# Patient Record
Sex: FEMALE | Race: WHITE | Employment: OTHER | URBAN - METROPOLITAN AREA
[De-identification: names, ages, dates, MRNs, and addresses within clinical notes are randomized per-mention and may not be internally consistent; named-entity substitution may affect disease eponyms.]

---

## 2017-05-07 ENCOUNTER — OFFICE VISIT (OUTPATIENT)
Dept: FAMILY MEDICINE CLINIC | Facility: CLINIC | Age: 62
End: 2017-05-07

## 2017-05-07 VITALS
TEMPERATURE: 98 F | BODY MASS INDEX: 27.6 KG/M2 | SYSTOLIC BLOOD PRESSURE: 134 MMHG | RESPIRATION RATE: 20 BRPM | HEART RATE: 84 BPM | HEIGHT: 62 IN | WEIGHT: 150 LBS | DIASTOLIC BLOOD PRESSURE: 80 MMHG | OXYGEN SATURATION: 99 %

## 2017-05-07 DIAGNOSIS — H11.31 SUBCONJUNCTIVAL HEMORRHAGE OF RIGHT EYE: Primary | ICD-10-CM

## 2017-05-07 PROCEDURE — 99202 OFFICE O/P NEW SF 15 MIN: CPT | Performed by: PHYSICIAN ASSISTANT

## 2017-05-07 NOTE — PATIENT INSTRUCTIONS
Subconjunctival Hemorrhage    A subconjunctival hemorrhage is a result of a broken blood vessel in the white portion of the eye. It is usually painless and may be caused by coughing, sneezing, or vomiting. An injury to the eye can cause this.  It can also

## 2017-05-07 NOTE — PROGRESS NOTES
CHIEF COMPLAINT:   Patient presents with:  Irritation: RT has some rediness x 2 days    HPI:   Guru Knutson is a 63year old female who presents with chief complaint of right eye redness. Symptoms began  2  days ago.  Symptoms have been stable since onse subconjunctival hemorrhage present on upper aspect of right eye. No periorbital edema, erythema, or tenderness to palpation.       ASSESSMENT AND PLAN:   Marilee Peralta is a 63year old female who presents with:    ASSESSMENT:   Subconjunctival hemorrhage

## 2017-09-06 ENCOUNTER — LAB ENCOUNTER (OUTPATIENT)
Dept: LAB | Age: 62
End: 2017-09-06
Attending: FAMILY MEDICINE
Payer: COMMERCIAL

## 2017-09-06 ENCOUNTER — OFFICE VISIT (OUTPATIENT)
Dept: FAMILY MEDICINE CLINIC | Facility: CLINIC | Age: 62
End: 2017-09-06

## 2017-09-06 ENCOUNTER — TELEPHONE (OUTPATIENT)
Dept: FAMILY MEDICINE CLINIC | Facility: CLINIC | Age: 62
End: 2017-09-06

## 2017-09-06 VITALS
WEIGHT: 153 LBS | SYSTOLIC BLOOD PRESSURE: 136 MMHG | OXYGEN SATURATION: 99 % | HEART RATE: 76 BPM | BODY MASS INDEX: 26.77 KG/M2 | HEIGHT: 63.25 IN | DIASTOLIC BLOOD PRESSURE: 74 MMHG | TEMPERATURE: 98 F | RESPIRATION RATE: 16 BRPM

## 2017-09-06 DIAGNOSIS — Z12.39 SCREENING FOR MALIGNANT NEOPLASM OF BREAST: ICD-10-CM

## 2017-09-06 DIAGNOSIS — Z12.4 CERVICAL CANCER SCREENING: ICD-10-CM

## 2017-09-06 DIAGNOSIS — E78.5 HYPERLIPIDEMIA, UNSPECIFIED HYPERLIPIDEMIA TYPE: ICD-10-CM

## 2017-09-06 DIAGNOSIS — M17.12 PATELLOFEMORAL ARTHRITIS OF LEFT KNEE: ICD-10-CM

## 2017-09-06 DIAGNOSIS — Z00.00 ANNUAL PHYSICAL EXAM: Primary | ICD-10-CM

## 2017-09-06 DIAGNOSIS — I10 ESSENTIAL HYPERTENSION: ICD-10-CM

## 2017-09-06 DIAGNOSIS — Z00.00 ROUTINE GENERAL MEDICAL EXAMINATION AT A HEALTH CARE FACILITY: ICD-10-CM

## 2017-09-06 LAB
ALBUMIN SERPL-MCNC: 3.8 G/DL (ref 3.5–4.8)
ALP LIVER SERPL-CCNC: 95 U/L (ref 46–118)
ALT SERPL-CCNC: 34 U/L (ref 14–54)
AST SERPL-CCNC: 21 U/L (ref 15–41)
BASOPHILS # BLD AUTO: 0.03 X10(3) UL (ref 0–0.1)
BASOPHILS NFR BLD AUTO: 0.4 %
BILIRUB SERPL-MCNC: 0.8 MG/DL (ref 0.1–2)
BUN BLD-MCNC: 15 MG/DL (ref 8–20)
CALCIUM BLD-MCNC: 9.3 MG/DL (ref 8.3–10.3)
CHLORIDE: 108 MMOL/L (ref 101–111)
CHOLEST SMN-MCNC: 170 MG/DL (ref ?–200)
CO2: 28 MMOL/L (ref 22–32)
CREAT BLD-MCNC: 0.59 MG/DL (ref 0.55–1.02)
EOSINOPHIL # BLD AUTO: 0.04 X10(3) UL (ref 0–0.3)
EOSINOPHIL NFR BLD AUTO: 0.6 %
ERYTHROCYTE [DISTWIDTH] IN BLOOD BY AUTOMATED COUNT: 12.2 % (ref 11.5–16)
GLUCOSE BLD-MCNC: 95 MG/DL (ref 70–99)
HCT VFR BLD AUTO: 46.8 % (ref 34–50)
HDLC SERPL-MCNC: 74 MG/DL (ref 45–?)
HDLC SERPL: 2.3 {RATIO} (ref ?–4.44)
HGB BLD-MCNC: 15.4 G/DL (ref 12–16)
IMMATURE GRANULOCYTE COUNT: 0.02 X10(3) UL (ref 0–1)
IMMATURE GRANULOCYTE RATIO %: 0.3 %
LDLC SERPL CALC-MCNC: 78 MG/DL (ref ?–130)
LDLC SERPL-MCNC: 18 MG/DL (ref 5–40)
LYMPHOCYTES # BLD AUTO: 1.93 X10(3) UL (ref 0.9–4)
LYMPHOCYTES NFR BLD AUTO: 27.8 %
M PROTEIN MFR SERPL ELPH: 7.6 G/DL (ref 6.1–8.3)
MCH RBC QN AUTO: 30.2 PG (ref 27–33.2)
MCHC RBC AUTO-ENTMCNC: 32.9 G/DL (ref 31–37)
MCV RBC AUTO: 91.8 FL (ref 81–100)
MONOCYTES # BLD AUTO: 0.51 X10(3) UL (ref 0.1–0.6)
MONOCYTES NFR BLD AUTO: 7.3 %
NEUTROPHIL ABS PRELIM: 4.42 X10 (3) UL (ref 1.3–6.7)
NEUTROPHILS # BLD AUTO: 4.42 X10(3) UL (ref 1.3–6.7)
NEUTROPHILS NFR BLD AUTO: 63.6 %
NONHDLC SERPL-MCNC: 96 MG/DL (ref ?–130)
PLATELET # BLD AUTO: 215 10(3)UL (ref 150–450)
POTASSIUM SERPL-SCNC: 3.9 MMOL/L (ref 3.6–5.1)
RBC # BLD AUTO: 5.1 X10(6)UL (ref 3.8–5.1)
RED CELL DISTRIBUTION WIDTH-SD: 40.7 FL (ref 35.1–46.3)
SODIUM SERPL-SCNC: 144 MMOL/L (ref 136–144)
TRIGLYCERIDES: 89 MG/DL (ref ?–150)
WBC # BLD AUTO: 7 X10(3) UL (ref 4–13)

## 2017-09-06 PROCEDURE — 87624 HPV HI-RISK TYP POOLED RSLT: CPT | Performed by: FAMILY MEDICINE

## 2017-09-06 PROCEDURE — 88175 CYTOPATH C/V AUTO FLUID REDO: CPT | Performed by: FAMILY MEDICINE

## 2017-09-06 PROCEDURE — 99396 PREV VISIT EST AGE 40-64: CPT | Performed by: FAMILY MEDICINE

## 2017-09-06 PROCEDURE — 80053 COMPREHEN METABOLIC PANEL: CPT

## 2017-09-06 PROCEDURE — 99213 OFFICE O/P EST LOW 20 MIN: CPT | Performed by: FAMILY MEDICINE

## 2017-09-06 PROCEDURE — 36415 COLL VENOUS BLD VENIPUNCTURE: CPT

## 2017-09-06 PROCEDURE — 85025 COMPLETE CBC W/AUTO DIFF WBC: CPT

## 2017-09-06 PROCEDURE — 80061 LIPID PANEL: CPT

## 2017-09-06 RX ORDER — MULTIVIT WITH MINERALS/LUTEIN
1000 TABLET ORAL DAILY
COMMUNITY

## 2017-09-06 RX ORDER — ATORVASTATIN CALCIUM 80 MG/1
80 TABLET, FILM COATED ORAL NIGHTLY
Qty: 90 TABLET | Refills: 1 | Status: SHIPPED | OUTPATIENT
Start: 2017-09-06 | End: 2017-09-06

## 2017-09-06 RX ORDER — METOPROLOL TARTRATE 50 MG/1
50 TABLET, FILM COATED ORAL 2 TIMES DAILY
Qty: 90 TABLET | Refills: 1 | Status: SHIPPED | OUTPATIENT
Start: 2017-09-06 | End: 2017-09-12

## 2017-09-06 RX ORDER — ATORVASTATIN CALCIUM 80 MG/1
80 TABLET, FILM COATED ORAL NIGHTLY
Qty: 90 TABLET | Refills: 1 | Status: SHIPPED | OUTPATIENT
Start: 2017-09-06 | End: 2018-03-05

## 2017-09-06 NOTE — PATIENT INSTRUCTIONS
Understanding Patellofemoral Syndrome    Patellofemoral syndrome is a condition that causes pain on the front of the knee. The large bones of the upper and lower leg meet at the knee.  This joint also includes a small triangle-shaped bone that rests on to · A shoe insert (orthotic). This can make your knee more stable. · Elastic tape or a brace. These can make your knee more stable. · Physical therapy. This may include exercises or other treatments. · Surgery.  In rare cases, if other treatments don’t rel 1. Quadriceps strengthening: isometrics. Position yourself as shown. Hold your right leg straight for 10 to 20 seconds and then relax. Do the exercise 5 to 10 times. 2. Quadriceps strengthening: straight leg lift. Position yourself as shown.  Raise you 7. Hip abductor strengthening (left side shown, front and side views). Position yourself as shown, standing on your left leg with the knee slightly bent.  Slowly raise your right foot about 30 degrees, hold for a few seconds and then slowly lower the foot a

## 2017-09-06 NOTE — PROGRESS NOTES
HPI:   Jong Monreal is a 63year old female that presents for well woman exam.     She is doing well. HTN stable on metoprolol. HLD stable on statin. She takes her medications regularly. She notes intermittent left knee pain for several months. auscultation bilterally, no rales/rhonchi/wheezing.   BREAST: No palpable masses, breasts are nontender, no nipple discharge, no skin changes, axillary lymph nodes non palpable  ABDOMEN:  Soft, nondistended, nontender, bowel sounds normal in all 4 quadrants

## 2017-09-06 NOTE — TELEPHONE ENCOUNTER
Pt states that she usually take one metoprolol per day the new instructions states take two per day.  Please clarify

## 2017-09-07 NOTE — TELEPHONE ENCOUNTER
Spoke with pt and clarified that she is on Metoprolol Tartrate 50mg daily and she says that has worked best for her with no issues, she states she will do BID if  recommends it. Please advise.

## 2017-09-07 NOTE — TELEPHONE ENCOUNTER
Left detailed message on phone number [phone] verified per HIPAA in regards to metoprolol. Advised to give the office a call if there are any questions.

## 2017-09-09 LAB — HPV I/H RISK 1 DNA SPEC QL NAA+PROBE: NEGATIVE

## 2017-09-11 LAB
LAST PAP RESULT: NORMAL
PAP HISTORY (OTHER THAN LAST PAP): NORMAL

## 2017-09-12 DIAGNOSIS — I10 ESSENTIAL HYPERTENSION: ICD-10-CM

## 2017-09-12 RX ORDER — METOPROLOL TARTRATE 50 MG/1
50 TABLET, FILM COATED ORAL 2 TIMES DAILY
Qty: 180 TABLET | Refills: 0 | Status: SHIPPED | OUTPATIENT
Start: 2017-09-12 | End: 2017-12-11

## 2017-10-18 ENCOUNTER — OFFICE VISIT (OUTPATIENT)
Dept: SURGERY | Facility: CLINIC | Age: 62
End: 2017-10-18

## 2017-10-18 VITALS — HEIGHT: 63 IN | BODY MASS INDEX: 27.11 KG/M2 | TEMPERATURE: 98 F | HEART RATE: 68 BPM | WEIGHT: 153 LBS

## 2017-10-18 DIAGNOSIS — N60.11 FIBROCYSTIC DISEASE OF RIGHT BREAST: Primary | ICD-10-CM

## 2017-10-18 PROCEDURE — 99212 OFFICE O/P EST SF 10 MIN: CPT | Performed by: SURGERY

## 2017-10-18 RX ORDER — FAMOTIDINE 20 MG/1
20 TABLET ORAL NIGHTLY PRN
COMMUNITY
Start: 2015-08-17

## 2017-10-18 NOTE — PROGRESS NOTES
Follow Up Visit Note       Active Problems      1. Fibrocystic disease of right breast          Chief Complaint   Patient presents with:  Breast Problem: Follow up after mammogram done 9/27/17 at [facility]. Denies any breast pain.          History of Present Ill (VITAMIN C) 1000 MG Oral Tab  Disp:  Rfl:    atorvastatin 80 MG Oral Tab Take 1 tablet (80 mg total) by mouth nightly. Disp: 90 tablet Rfl: 1   aspirin 81 MG Oral Tab Take 81 mg by mouth daily.  Disp:  Rfl:    Multiple Vitamins-Minerals (MULTIVITAMIN OR) Ta no mass, no nipple discharge, no skin change and no tenderness. Left breast exhibits inverted nipple. Left breast exhibits no mass, no nipple discharge, no skin change and no tenderness.  Breasts are symmetrical.   Lymphadenopathy:        Right axillary: No

## 2017-12-06 DIAGNOSIS — I10 ESSENTIAL HYPERTENSION: ICD-10-CM

## 2017-12-06 RX ORDER — METOPROLOL TARTRATE 50 MG/1
TABLET, FILM COATED ORAL
Qty: 180 TABLET | Refills: 0 | Status: SHIPPED | OUTPATIENT
Start: 2017-12-06 | End: 2018-03-06

## 2017-12-06 NOTE — TELEPHONE ENCOUNTER
Requesting: Metoprolol Tart 50mg  LOV: 9/6/17  RTC: 6 months  Last Relevant Labs: 9/6/17  Filled: 9/12/17 #180 with 0 refills    No future appointments.       Hypertension Medications Protocol [id number] 7:32 AM   CMP or BMP in past 12 months    Last serum

## 2018-03-05 DIAGNOSIS — E78.5 HYPERLIPIDEMIA, UNSPECIFIED HYPERLIPIDEMIA TYPE: ICD-10-CM

## 2018-03-05 RX ORDER — ATORVASTATIN CALCIUM 80 MG/1
80 TABLET, FILM COATED ORAL NIGHTLY
Qty: 90 TABLET | Refills: 1 | Status: SHIPPED | OUTPATIENT
Start: 2018-03-05 | End: 2018-09-01

## 2018-03-06 DIAGNOSIS — I10 ESSENTIAL HYPERTENSION: ICD-10-CM

## 2018-03-06 RX ORDER — METOPROLOL TARTRATE 50 MG/1
50 TABLET, FILM COATED ORAL 2 TIMES DAILY
Qty: 180 TABLET | Refills: 0 | Status: SHIPPED | OUTPATIENT
Start: 2018-03-06 | End: 2018-06-02

## 2018-03-06 RX ORDER — METOPROLOL TARTRATE 50 MG/1
TABLET, FILM COATED ORAL
Qty: 180 TABLET | Refills: 0 | OUTPATIENT
Start: 2018-03-06

## 2018-03-06 NOTE — TELEPHONE ENCOUNTER
Patient called back and scheduled the following OV:     Future Appointments  Date Time Provider Marcy Haro   3/13/2018 4:00 PM Marium Rascon DO [facility] [address] [facility] [address]     Patient requesting #[phone] for insurance purposed.  Patient notified that I can no

## 2018-03-06 NOTE — TELEPHONE ENCOUNTER
METOPROLOL TAB TAR 50MG  Will file in chart as: METOPROLOL TARTRATE 50 MG Oral Tab  TAKE 1 TABLET TWICE A DAY       Disp: 180 tablet Refills: 0    Class: Normal Start: 3/6/2018   For: Essential hypertension  Documented:3 years ago  Last refill: 12/6/2017 Return in about 6 months (around 3/6/2018).

## 2018-03-13 ENCOUNTER — OFFICE VISIT (OUTPATIENT)
Dept: FAMILY MEDICINE CLINIC | Facility: CLINIC | Age: 63
End: 2018-03-13

## 2018-03-13 VITALS
WEIGHT: 158 LBS | DIASTOLIC BLOOD PRESSURE: 80 MMHG | HEIGHT: 63.25 IN | RESPIRATION RATE: 16 BRPM | SYSTOLIC BLOOD PRESSURE: 146 MMHG | HEART RATE: 78 BPM | BODY MASS INDEX: 27.65 KG/M2

## 2018-03-13 DIAGNOSIS — E78.5 HYPERLIPIDEMIA, UNSPECIFIED HYPERLIPIDEMIA TYPE: ICD-10-CM

## 2018-03-13 DIAGNOSIS — Z51.81 THERAPEUTIC DRUG MONITORING: ICD-10-CM

## 2018-03-13 DIAGNOSIS — I10 ESSENTIAL HYPERTENSION: Primary | ICD-10-CM

## 2018-03-13 PROCEDURE — 99213 OFFICE O/P EST LOW 20 MIN: CPT | Performed by: FAMILY MEDICINE

## 2018-03-13 NOTE — PATIENT INSTRUCTIONS
Blood Pressure  Check blood pressure daily and bring log to next visit. Goal BP < 140/90  Call or follow up sooner if consistently >160/100    SHINGRIX - new shingles vaccine. Can return or get from Pharmacy, just bring record of vaccine.      Discharge positive change by cutting back to even 2,400 mg of sodium a day.   · Follow the DASH (Dietary Approaches to Stop Hypertension) eating plan. This plan recommends vegetables, fruits, whole gains, and other heart healthy foods.   · Begin an exercise program.

## 2018-03-13 NOTE — PROGRESS NOTES
HPI:   Fina Vann is a 63year old female that presents for follow-up and medication refills. She has history of hyperlipidemia on aspirin and statin without side effects. She tries to eat a heart healthy diet and walks for exercise.   She is a non 158 lb. Vital signs reviewed. Appears stated age, well groomed. Physical Exam:  GEN:  Patient is alert, awake and oriented, well developed, well nourished, no apparent distress.   HEENT:     Head:  Normocephalic, atraumatic    Eyes: EOMI, PERRLA, no scler

## 2018-05-28 ENCOUNTER — PATIENT MESSAGE (OUTPATIENT)
Dept: FAMILY MEDICINE CLINIC | Facility: CLINIC | Age: 63
End: 2018-05-28

## 2018-05-29 NOTE — TELEPHONE ENCOUNTER
From: Leroy Vail  To: Celestinamiri TorresDO  Sent: 5/28/2018 2:22 PM CDT  Subject: Non-Urgent Medical Question    I need to come in for a blood test. Do I need an appointment? What are the hours for lab?     González Meza

## 2018-06-02 DIAGNOSIS — I10 ESSENTIAL HYPERTENSION: ICD-10-CM

## 2018-06-04 RX ORDER — METOPROLOL TARTRATE 50 MG/1
TABLET, FILM COATED ORAL
Qty: 180 TABLET | Refills: 0 | Status: SHIPPED | OUTPATIENT
Start: 2018-06-04 | End: 2018-08-31

## 2018-06-04 NOTE — TELEPHONE ENCOUNTER
Requesting: Metoprolol Tartrate 50mg  LOV: 3/13/18  RTC: 6 months  Last Relevant Labs: 9/6/17  Filled: 3/6/18 #180 with 0 refills    No future appointments.       Hypertension Medications Protocol Passed6/2 9:32 AM   CMP or BMP in past 12 months    Last ser

## 2018-08-31 DIAGNOSIS — I10 ESSENTIAL HYPERTENSION: ICD-10-CM

## 2018-08-31 RX ORDER — METOPROLOL TARTRATE 50 MG/1
50 TABLET, FILM COATED ORAL 2 TIMES DAILY
Qty: 90 TABLET | Refills: 0 | Status: SHIPPED | OUTPATIENT
Start: 2018-08-31 | End: 2019-04-04

## 2018-08-31 NOTE — TELEPHONE ENCOUNTER
Requesting Metoprolol  LOV: 3/13/18  RTC: 6 months  Last Relevant Labs: 9/6/17  Filled: 6/4/18 #180 with 0 refills    No future appointments. PP - due for lab in September order is in place.   One month refill given

## 2018-10-03 ENCOUNTER — TELEPHONE (OUTPATIENT)
Dept: SURGERY | Facility: CLINIC | Age: 63
End: 2018-10-03

## 2018-10-10 ENCOUNTER — OFFICE VISIT (OUTPATIENT)
Dept: SURGERY | Facility: CLINIC | Age: 63
End: 2018-10-10
Payer: COMMERCIAL

## 2018-10-10 VITALS — HEART RATE: 68 BPM | WEIGHT: 158 LBS | BODY MASS INDEX: 28 KG/M2 | HEIGHT: 63 IN | TEMPERATURE: 99 F

## 2018-10-10 DIAGNOSIS — N60.11 FIBROCYSTIC DISEASE OF RIGHT BREAST: ICD-10-CM

## 2018-10-10 DIAGNOSIS — Z12.31 SCREENING MAMMOGRAM, ENCOUNTER FOR: Primary | ICD-10-CM

## 2018-10-10 PROCEDURE — 99213 OFFICE O/P EST LOW 20 MIN: CPT | Performed by: SURGERY

## 2018-10-10 RX ORDER — ATORVASTATIN CALCIUM 80 MG/1
80 TABLET, FILM COATED ORAL
COMMUNITY
Start: 2018-10-09 | End: 2019-05-17

## 2018-10-10 NOTE — PROGRESS NOTES
Follow Up Visit Note       Active Problems      1. Screening mammogram, encounter for    2. Fibrocystic disease of right breast          Chief Complaint   Patient presents with:  Breast Problem: Follow up after mammogram done 9/28/18 at [facility][note 3]. Doing well. Activity      Alcohol use: Yes        Comment: one drink per week      Drug use: No      Sexual activity: Not Currently        Partners: Male    Other Topics      Concerns:        Caffeine Concern: Not Asked        Exercise: Not Asked        Seat Belt: Not Negative for adenopathy. Does not bruise/bleed easily. Psychiatric/Behavioral: Negative for behavioral problems and sleep disturbance.         Physical Findings   Pulse 68   Temp 98.7 °F (37.1 °C) (Oral)   Ht 63\"   Wt 158 lb   BMI 27.99 kg/m²   Physical her increased risk of breast cancer, which does qualify her for screening MRIs. The risks, benefits, and alternatives to screening MRI were discussed. Risks include, but are not limited to, further breast biopsies. She declined at this time.   · I asked

## 2018-10-12 PROBLEM — Z80.3 FAMILY HISTORY OF BREAST CANCER IN SISTER: Status: ACTIVE | Noted: 2018-10-12

## 2019-02-15 ENCOUNTER — OFFICE VISIT (OUTPATIENT)
Dept: FAMILY MEDICINE CLINIC | Facility: CLINIC | Age: 64
End: 2019-02-15
Payer: COMMERCIAL

## 2019-02-15 VITALS
DIASTOLIC BLOOD PRESSURE: 84 MMHG | TEMPERATURE: 98 F | OXYGEN SATURATION: 98 % | WEIGHT: 147 LBS | SYSTOLIC BLOOD PRESSURE: 150 MMHG | HEART RATE: 104 BPM | BODY MASS INDEX: 27.05 KG/M2 | HEIGHT: 62 IN | RESPIRATION RATE: 20 BRPM

## 2019-02-15 DIAGNOSIS — H92.02 OTALGIA OF LEFT EAR: Primary | ICD-10-CM

## 2019-02-15 DIAGNOSIS — R50.9 LOW GRADE FEVER: ICD-10-CM

## 2019-02-15 PROCEDURE — 99213 OFFICE O/P EST LOW 20 MIN: CPT | Performed by: NURSE PRACTITIONER

## 2019-02-15 NOTE — PROGRESS NOTES
HPI:   Toshia Chang is a 66year old female who presents with ill symptoms for  1  months. Patient reports left ear pain intermittently over the last month and 1/2.  At the beginning of symptoms she was in [place] and had sinus symptoms, sore throat, pin Problem Relation Age of Onset   • Breast Cancer Sister    • Breast Cancer Sister    • Colon Cancer Neg       Social History    Tobacco Use      Smoking status: Never Smoker      Smokeless tobacco: Never Used    Alcohol use: Yes      Comment: one drink per · Return if symptoms begin such as sore throat, sinus pain, productive cough or if fever lasts longer than 10-14 days. · Follow up with ENT as discussed.        Understanding Ear Barotrauma    Ear barotrauma is ear damage caused by a difference in pressure Symptoms can be mild to severe.  The most common symptoms of ear barotrauma may include:  · Feeling of pressure in the ear  · Ear pain  · Dizziness  · Feeling like you have a blocked ear  · Bleeding from the ears or into the middle ear  · Ringing in your ea

## 2019-02-15 NOTE — PATIENT INSTRUCTIONS
· Use insufflation (blowing gently out against closed mouth and pinched nose) several times daily. · May take Tylenol if needed for discomfort/pain.   · Return if symptoms begin such as sore throat, sinus pain, productive cough or if fever lasts longer herman irritants like tobacco smoke  · Certain hormonal changes (such as, during pregnancy)  What are the symptoms of ear barotrauma? Symptoms can be mild to severe.  The most common symptoms of ear barotrauma may include:  · Feeling of pressure in the ear  · Ear

## 2019-02-22 ENCOUNTER — OFFICE VISIT (OUTPATIENT)
Dept: FAMILY MEDICINE CLINIC | Facility: CLINIC | Age: 64
End: 2019-02-22
Payer: COMMERCIAL

## 2019-02-22 VITALS
OXYGEN SATURATION: 99 % | DIASTOLIC BLOOD PRESSURE: 82 MMHG | RESPIRATION RATE: 18 BRPM | HEART RATE: 79 BPM | SYSTOLIC BLOOD PRESSURE: 146 MMHG | TEMPERATURE: 98 F

## 2019-02-22 DIAGNOSIS — L30.9 DERMATITIS: Primary | ICD-10-CM

## 2019-02-22 PROCEDURE — 99213 OFFICE O/P EST LOW 20 MIN: CPT | Performed by: NURSE PRACTITIONER

## 2019-02-22 NOTE — PATIENT INSTRUCTIONS
Follow up if worsening or no improvement    Contact Dermatitis  Contact dermatitis is a skin rash caused by something that touches the skin and makes it irritated and inflamed. Your skin may be red, swollen, dry, and may be cracked.  Blisters may form and · You can also try wet dressings. One way to do this is to wear a wet piece of clothing under a dry one. Wear a damp shirt under a dry shirt if your upper body is affected. This can relieve itching and prevent you from scratching the affected area.   · You © 5141-7601 [employer] [address]. [address] [facility], [facility] [place]. All rights reserved. This information is not intended as a substitute for professional medical care. Always follow your healthcare professional's instructions.

## 2019-02-23 NOTE — PROGRESS NOTES
CHIEF COMPLAINT:   Patient presents with:  Rash: on back and chest, small, itching, raised - Thinks it may be a few weeks old         HPI:   Kristopher Gómez is a 66year old female who presents for evaluation of a rash.   Per patient rash started in the pas • Breast Cancer Sister    • Hypertension Brother    • Heart Attack Father    • Stroke Father    • Mental Disorder Daughter         Anxiety and depression   • Colon Cancer Neg       Social History    Tobacco Use      Smoking status: Never Smoker      Smokel Risk and benefits of medication discussed. The patient indicates understanding of these issues and agrees to the plan. The patient is asked to see PCP in 3 days if sx's are not improving or if they worsen.     Patient Instructions     Follow up if wors · Apply cold compresses to soothe your sores to help relieve your symptoms. Do this for 30 minutes 3 to 4 times a day. You can make a cold compress by soaking a cloth in cold water. Squeeze out excess water.  You can add colloidal oatmeal to the water to he · Severe swelling of your face, eyelids, mouth, throat or tongue  · Trouble urinating due to swelling in the genital area  · Fever of 100.4°F (38°C) or higher  · Redness or swelling that gets worse  · Pain that gets worse  · Foul-smelling fluid leaking fro

## 2019-03-14 ENCOUNTER — OFFICE VISIT (OUTPATIENT)
Dept: PHYSICAL THERAPY | Age: 64
End: 2019-03-14
Attending: FAMILY MEDICINE
Payer: COMMERCIAL

## 2019-03-14 PROCEDURE — 97110 THERAPEUTIC EXERCISES: CPT

## 2019-03-14 PROCEDURE — 97161 PT EVAL LOW COMPLEX 20 MIN: CPT

## 2019-03-14 NOTE — PROGRESS NOTES
LOWER EXTREMITY EVALUATION:   Referring Physician: Dr. Sebastián Alex  Diagnosis: Patella Femoral Syndrome      Date of Service: 3/14/2019     PATIENT SUMMARY   Osiel Tobin is a 66year old y/o female who presents to therapy today with complaints of l to her left knee, but did state she felt a stretching relief to left sided tibial distraction. She was also tender to palpation along her left IT Band and her left IT Band was noticeably tighter than her right.  Chapincito Oviedo would benefit from skilled Physical The negative, L  negative  Posterior Drawer Test: R  negative L  negative  Shukri's Test: R  negative, L  negative  Apley Compression: R  negative, L  negative  Thessaly Test: R  negative, L  Negative  Tibial Distraction: L; stretch/relief    *4\" Step Test: participate in planning and for this course of care. Thank you for your referral. Please co-sign or sign and return this letter via fax as soon as possible to [phone].  If you have any questions, please contact me at Dept: [phone]    Sincerely,

## 2019-04-04 ENCOUNTER — TELEPHONE (OUTPATIENT)
Dept: INTERNAL MEDICINE CLINIC | Facility: CLINIC | Age: 64
End: 2019-04-04

## 2019-04-04 ENCOUNTER — OFFICE VISIT (OUTPATIENT)
Dept: INTERNAL MEDICINE CLINIC | Facility: CLINIC | Age: 64
End: 2019-04-04
Payer: COMMERCIAL

## 2019-04-04 VITALS
WEIGHT: 152.5 LBS | BODY MASS INDEX: 28.06 KG/M2 | HEART RATE: 66 BPM | DIASTOLIC BLOOD PRESSURE: 64 MMHG | TEMPERATURE: 98 F | RESPIRATION RATE: 16 BRPM | SYSTOLIC BLOOD PRESSURE: 122 MMHG | HEIGHT: 62 IN

## 2019-04-04 DIAGNOSIS — M25.561 CHRONIC PAIN OF BOTH KNEES: Primary | ICD-10-CM

## 2019-04-04 DIAGNOSIS — K21.9 GASTROESOPHAGEAL REFLUX DISEASE WITHOUT ESOPHAGITIS: ICD-10-CM

## 2019-04-04 DIAGNOSIS — I10 ESSENTIAL HYPERTENSION: ICD-10-CM

## 2019-04-04 DIAGNOSIS — G89.29 CHRONIC PAIN OF BOTH KNEES: Primary | ICD-10-CM

## 2019-04-04 DIAGNOSIS — R73.01 ELEVATED FASTING GLUCOSE: ICD-10-CM

## 2019-04-04 DIAGNOSIS — E78.00 PURE HYPERCHOLESTEROLEMIA: ICD-10-CM

## 2019-04-04 DIAGNOSIS — M25.562 CHRONIC PAIN OF BOTH KNEES: Primary | ICD-10-CM

## 2019-04-04 PROCEDURE — 99203 OFFICE O/P NEW LOW 30 MIN: CPT | Performed by: INTERNAL MEDICINE

## 2019-04-04 RX ORDER — METOPROLOL TARTRATE 50 MG/1
50 TABLET, FILM COATED ORAL 2 TIMES DAILY
Qty: 90 TABLET | Refills: 1 | Status: SHIPPED | OUTPATIENT
Start: 2019-04-04

## 2019-04-04 NOTE — PROGRESS NOTES
[facility] Internal Medicine Office Note  Chief Complaint:   Patient presents with:   Other: Physical therapy referral needed--leg pain      HPI:   This is a 60year old female coming in for establishing care  HPI  Physical therapy for both knees Tab  Disp:  Rfl:    Lactobacillus-Inulin ([facility] OR)  Disp:  Rfl:    Ascorbic Acid (VITAMIN C) 1000 MG Oral Tab  Disp:  Rfl:    aspirin 81 MG Oral Tab Take 81 mg by mouth daily.  Disp:  Rfl:    Multiple Vitamins-Minerals (MULTIVITAMIN O hypercholesterolemia  Gastroesophageal reflux disease without esophagitis      The plan is as follows  Viridiana Mahajan was seen today for other.     Diagnoses and all orders for this visit:    Chronic pain of both knees -gradually started; no trauma; worse with st

## 2019-04-04 NOTE — TELEPHONE ENCOUNTER
Records Requested from:    Dr. Cristo Sun ([facility], reports)  Phone: [phone]  Fax: [phone]      Confirmation was received. Copy of form made and placed in teal accordion file. Original form sent to scanning.

## 2019-04-17 ENCOUNTER — OFFICE VISIT (OUTPATIENT)
Dept: PHYSICAL THERAPY | Age: 64
End: 2019-04-17
Attending: INTERNAL MEDICINE
Payer: COMMERCIAL

## 2019-04-17 PROCEDURE — 97110 THERAPEUTIC EXERCISES: CPT

## 2019-04-17 PROCEDURE — 97140 MANUAL THERAPY 1/> REGIONS: CPT

## 2019-04-17 NOTE — PROGRESS NOTES
Dx: Patella Femoral Syndrome        Authorized # of Visits:  8         Next MD visit: none scheduled  Fall Risk: standard         Precautions: n/a             Subjective:  The patient came into therapy today stating her left knee pain has slightly improved demonstrate increased hip ER/ABD strength to 4+/5 to negotiate stairs and complete squatting activities without increased             knee pain > VAS 2/10 in 8 visits  · Pt will be independent and compliant with comprehensive HEP to maintain progress achie

## 2019-04-22 ENCOUNTER — OFFICE VISIT (OUTPATIENT)
Dept: PHYSICAL THERAPY | Age: 64
End: 2019-04-22
Attending: INTERNAL MEDICINE
Payer: COMMERCIAL

## 2019-04-22 PROCEDURE — 97110 THERAPEUTIC EXERCISES: CPT

## 2019-04-22 PROCEDURE — 97140 MANUAL THERAPY 1/> REGIONS: CPT

## 2019-04-22 NOTE — PROGRESS NOTES
Dx: Patella Femoral Syndrome        Authorized # of Visits:  8         Next MD visit: none scheduled  Fall Risk: standard         Precautions: n/a             Subjective:  The patient came into therapy today stating she had no pain when she walked in and on without increased knee pain > VAS 2/10 in 8 visits  · Pt will be independent and compliant with comprehensive HEP to maintain progress achieved in PT 8 visits  · Pt will perform 10, 6\" step ups with < or = VAS  2/10 pain so she can negotiate stairs more c

## 2019-04-25 ENCOUNTER — APPOINTMENT (OUTPATIENT)
Dept: PHYSICAL THERAPY | Age: 64
End: 2019-04-25
Payer: COMMERCIAL

## 2019-04-26 ENCOUNTER — OFFICE VISIT (OUTPATIENT)
Dept: PHYSICAL THERAPY | Age: 64
End: 2019-04-26
Attending: INTERNAL MEDICINE
Payer: COMMERCIAL

## 2019-04-26 PROCEDURE — 97110 THERAPEUTIC EXERCISES: CPT

## 2019-04-26 PROCEDURE — 97112 NEUROMUSCULAR REEDUCATION: CPT

## 2019-04-26 NOTE — PROGRESS NOTES
Dx: Patella Femoral Syndrome        Authorized # of Visits:  8         Next MD visit: none scheduled  Fall Risk: standard         Precautions: n/a             Subjective:  The patient came into therapy today stating she had no left knee pain when she walked can negotiate stairs more comfortably in 8 visits       Plan: POC will emphasize LE strengthening, flexibility, STM, and joint mobilizations.   Date: 4/17/2019     TX#: 2/8 Date: 4/22/19     TX#: 3/8   Date: 4/26/19                  TX#: 4/8 Date:

## 2019-04-29 ENCOUNTER — OFFICE VISIT (OUTPATIENT)
Dept: PHYSICAL THERAPY | Age: 64
End: 2019-04-29
Attending: INTERNAL MEDICINE
Payer: COMMERCIAL

## 2019-04-29 PROCEDURE — 97110 THERAPEUTIC EXERCISES: CPT

## 2019-04-29 PROCEDURE — 97140 MANUAL THERAPY 1/> REGIONS: CPT

## 2019-04-29 NOTE — PROGRESS NOTES
Dx: Patella Femoral Syndrome        Authorized # of Visits:  8         Next MD visit: none scheduled  Fall Risk: standard         Precautions: n/a             Subjective:  The patient came into therapy today stating she had no left knee pain when she walked comprehensive HEP to maintain progress achieved in PT 8 visits - MET  · Pt will perform 10, 6\" step ups with < or = VAS  2/10 pain so she can negotiate stairs more comfortably in 8 visits - MET      Plan: POC will emphasize LE strengthening, flexibility, 10 reps, 2 sets TRX suspension forward and backward lunges R and L x 10 reps, 2 sets        LLE Tandem and SLS Balance w/ and w/o FP and dynamic catching/throwing - 10 mins LLE Tandem and SLS Balance w/ and w/o FP and dynamic catching/throwing - 5 mins

## 2019-05-01 ENCOUNTER — OFFICE VISIT (OUTPATIENT)
Dept: PHYSICAL THERAPY | Age: 64
End: 2019-05-01
Attending: INTERNAL MEDICINE
Payer: COMMERCIAL

## 2019-05-01 PROCEDURE — 97110 THERAPEUTIC EXERCISES: CPT

## 2019-05-01 PROCEDURE — 97140 MANUAL THERAPY 1/> REGIONS: CPT

## 2019-05-01 NOTE — PROGRESS NOTES
Dx: Patella Femoral Syndrome        Authorized # of Visits:  8         Next MD visit: none scheduled  Fall Risk: standard         Precautions: n/a             Subjective:  The patient came into therapy today stating she had no left knee pain when she walked visits  · Pt will be independent and compliant with comprehensive HEP to maintain progress achieved in PT 8 visits - MET  · Pt will perform 10, 6\" step ups with < or = VAS  2/10 pain so she can negotiate stairs more comfortably in 8 visits - MET      Plan quadruped w/ GB x 20 reps, 1 set -- -- -- --         Wall Sits x 20 sec, 4 sets      Wall Squats x 15, 2 sets Wall Squats x 15, 2 sets Wall Squats x 15, 2 sets Wall Squats w/ Tr A holds x 10, 2 sets     Fire Hydrants R and L side w/ GB x 20 reps, 1 set --

## 2019-05-06 ENCOUNTER — APPOINTMENT (OUTPATIENT)
Dept: PHYSICAL THERAPY | Age: 64
End: 2019-05-06
Attending: INTERNAL MEDICINE
Payer: COMMERCIAL

## 2019-05-07 ENCOUNTER — OFFICE VISIT (OUTPATIENT)
Dept: PHYSICAL THERAPY | Age: 64
End: 2019-05-07
Attending: INTERNAL MEDICINE
Payer: COMMERCIAL

## 2019-05-07 ENCOUNTER — APPOINTMENT (OUTPATIENT)
Dept: PHYSICAL THERAPY | Age: 64
End: 2019-05-07
Attending: INTERNAL MEDICINE
Payer: COMMERCIAL

## 2019-05-07 PROCEDURE — 97110 THERAPEUTIC EXERCISES: CPT

## 2019-05-07 PROCEDURE — 97140 MANUAL THERAPY 1/> REGIONS: CPT

## 2019-05-07 NOTE — PROGRESS NOTES
Progress/Discharge Summary    Pt has attended 7 visits in Physical Therapy. Subjective: The patient came into therapy stating she had no left knee pain at the onset of the session.  The patient has been adhering to her HEP twice a day since her last 3/8    Date: 4/26/19                   TX#: 4/8 Date: 4/29/19                   TX#: 5/8 Date: 5/1/19                   TX#: 6/8 Date: 5/7/19                  TX#: 7/8 Date:               TX#: 8/     Treadmill 2.2 mph - 6 mins [employer] Bike - 5 mins Sport sets [employer] w/ Tr A holds x 10, 2 sets     Fire Hydrants R and L side w/ GB x 20 reps, 1 set -- -- --    L single leg STS on bleacher seat x 10, 2 sets       TKE w/  LLE x 10 reps w/ 5 sec holds, 1 set -- -- --  --         TRX suspension forward and

## 2019-05-09 ENCOUNTER — APPOINTMENT (OUTPATIENT)
Dept: PHYSICAL THERAPY | Age: 64
End: 2019-05-09
Attending: INTERNAL MEDICINE
Payer: COMMERCIAL

## 2019-05-11 ENCOUNTER — APPOINTMENT (OUTPATIENT)
Dept: LAB | Age: 64
End: 2019-05-11
Attending: INTERNAL MEDICINE
Payer: COMMERCIAL

## 2019-05-11 DIAGNOSIS — E78.00 PURE HYPERCHOLESTEROLEMIA: ICD-10-CM

## 2019-05-11 DIAGNOSIS — R73.01 ELEVATED FASTING GLUCOSE: ICD-10-CM

## 2019-05-11 PROCEDURE — 36415 COLL VENOUS BLD VENIPUNCTURE: CPT

## 2019-05-11 PROCEDURE — 80061 LIPID PANEL: CPT

## 2019-05-11 PROCEDURE — 83036 HEMOGLOBIN GLYCOSYLATED A1C: CPT

## 2019-05-11 PROCEDURE — 80053 COMPREHEN METABOLIC PANEL: CPT

## 2019-05-13 ENCOUNTER — APPOINTMENT (OUTPATIENT)
Dept: PHYSICAL THERAPY | Age: 64
End: 2019-05-13
Attending: INTERNAL MEDICINE
Payer: COMMERCIAL

## 2019-05-15 ENCOUNTER — APPOINTMENT (OUTPATIENT)
Dept: PHYSICAL THERAPY | Age: 64
End: 2019-05-15
Attending: INTERNAL MEDICINE
Payer: COMMERCIAL

## 2019-05-17 ENCOUNTER — OFFICE VISIT (OUTPATIENT)
Dept: INTERNAL MEDICINE CLINIC | Facility: CLINIC | Age: 64
End: 2019-05-17
Payer: COMMERCIAL

## 2019-05-17 VITALS
RESPIRATION RATE: 14 BRPM | WEIGHT: 151.75 LBS | HEIGHT: 62 IN | OXYGEN SATURATION: 99 % | DIASTOLIC BLOOD PRESSURE: 68 MMHG | TEMPERATURE: 98 F | BODY MASS INDEX: 27.92 KG/M2 | HEART RATE: 83 BPM | SYSTOLIC BLOOD PRESSURE: 138 MMHG

## 2019-05-17 DIAGNOSIS — R73.01 ELEVATED FASTING GLUCOSE: ICD-10-CM

## 2019-05-17 DIAGNOSIS — R20.0 NUMBNESS OF FINGERS: ICD-10-CM

## 2019-05-17 DIAGNOSIS — E78.00 PURE HYPERCHOLESTEROLEMIA: ICD-10-CM

## 2019-05-17 DIAGNOSIS — I10 ESSENTIAL HYPERTENSION: Primary | ICD-10-CM

## 2019-05-17 PROCEDURE — 99214 OFFICE O/P EST MOD 30 MIN: CPT | Performed by: INTERNAL MEDICINE

## 2019-05-17 RX ORDER — ATORVASTATIN CALCIUM 80 MG/1
80 TABLET, FILM COATED ORAL NIGHTLY
Qty: 90 TABLET | Refills: 3 | Status: SHIPPED | OUTPATIENT
Start: 2019-05-17 | End: 2019-12-24

## 2019-05-17 NOTE — PROGRESS NOTES
[facility] Internal Medicine Office Note  Chief Complaint:   Patient presents with:  Numbness: L Hand  Follow - Up: 6 Month Blood work       HPI:   This is a 60year old female coming in for follow up and numbness   HPI    Numbness of thumb,index as needed. Disp:  Rfl:    Lactobacillus-Inulin ([facility]) Take by mouth daily. Disp:  Rfl:    Ascorbic Acid (VITAMIN C) 1000 MG Oral Tab Take 1,000 mg by mouth daily.    Disp:  Rfl:    aspirin 81 MG Oral Tab Take 81 mg by mouth christy up.    Diagnoses and all orders for this visit:    Essential hypertension -chronic, stable. Cont present management     Pure hypercholesterolemia -on goal; cont present management  -     atorvastatin 80 MG Oral Tab;  Take 1 tablet (80 mg total) by mouth nig

## 2019-06-24 NOTE — TELEPHONE ENCOUNTER
Records received from her cardiologist - Dr. Bijan Pressley in [facility]    Myocardial perfusion scan 3/8/2019:  Indications: abnormal EKG and chest pain-precordial  Results:   Normal cardiac nuclear perfusion study except for fixed defect likely due to [facility]

## 2019-10-02 ENCOUNTER — TELEPHONE (OUTPATIENT)
Dept: SURGERY | Facility: CLINIC | Age: 64
End: 2019-10-02

## 2019-10-02 NOTE — TELEPHONE ENCOUNTER
----- Message from Nicole Christianson MD sent at 10/2/2019  8:54 AM CDT -----  Pt with normal mammogram at [facility].   Needs annual breast exam.  Please have patient follow up for breast exam.    Thanks,  Homar Khan

## 2019-10-03 ENCOUNTER — TELEPHONE (OUTPATIENT)
Dept: SURGERY | Facility: CLINIC | Age: 64
End: 2019-10-03

## 2019-10-03 NOTE — TELEPHONE ENCOUNTER
appt made for f/u.     Future Appointments   Date Time Provider Marcy Haro   10/16/2019  4:00 PM Tommy Mccann MD [facility] [address] no

## 2019-10-03 NOTE — TELEPHONE ENCOUNTER
----- Message from Shazia Kennedy MD sent at 10/2/2019  8:54 AM CDT -----  Pt with normal mammogram at [facility].   Needs annual breast exam.  Please have patient follow up for breast exam.    Thanks,  MERA

## 2019-10-15 ENCOUNTER — OFFICE VISIT (OUTPATIENT)
Dept: FAMILY MEDICINE CLINIC | Facility: CLINIC | Age: 64
End: 2019-10-15
Payer: COMMERCIAL

## 2019-10-15 VITALS
RESPIRATION RATE: 20 BRPM | OXYGEN SATURATION: 98 % | HEIGHT: 62 IN | HEART RATE: 105 BPM | TEMPERATURE: 100 F | SYSTOLIC BLOOD PRESSURE: 138 MMHG | BODY MASS INDEX: 27.79 KG/M2 | WEIGHT: 151 LBS | DIASTOLIC BLOOD PRESSURE: 82 MMHG

## 2019-10-15 DIAGNOSIS — J01.10 ACUTE FRONTAL SINUSITIS, RECURRENCE NOT SPECIFIED: Primary | ICD-10-CM

## 2019-10-15 PROCEDURE — 99213 OFFICE O/P EST LOW 20 MIN: CPT | Performed by: NURSE PRACTITIONER

## 2019-10-15 RX ORDER — DOXYCYCLINE HYCLATE 100 MG/1
100 CAPSULE ORAL 2 TIMES DAILY
Qty: 14 CAPSULE | Refills: 0 | Status: SHIPPED | OUTPATIENT
Start: 2019-10-15 | End: 2019-10-22

## 2019-10-15 RX ORDER — FLUTICASONE PROPIONATE 50 MCG
SPRAY, SUSPENSION (ML) NASAL
Qty: 1 BOTTLE | Refills: 0 | Status: SHIPPED | OUTPATIENT
Start: 2019-10-15 | End: 2021-11-10

## 2019-10-15 NOTE — PROGRESS NOTES
CHIEF COMPLAINT:   Patient presents with:  Fever: 99.6 highest today, No OTC meds taken  Cough: wet at AM, s/s for 5 days. Headache: voice problem      HPI:   Toshia Chang is a 60year old female who presents for sinus congestion.    Pt reports cold s • Breast Cancer Sister    • Hypertension Sister    • Breast Cancer Sister    • Hypertension Brother    • Heart Attack Father    • Stroke Father    • Mental Disorder Daughter         Anxiety and depression   • Colon Cancer Neg       Social History    Tobacc Requested Prescriptions     Signed Prescriptions Disp Refills   • Doxycycline Hyclate 100 MG Oral Cap 14 capsule 0     Sig: Take 1 capsule (100 mg total) by mouth 2 (two) times daily for 7 days.    • Fluticasone Propionate 50 MCG/ACT Nasal Suspension 1 Al · You can use an over-the-counter decongestant, unless a similar medicine was prescribed to you. Nasal sprays work the fastest. Use one that contains phenylephrine or oxymetazoline. First blow your nose gently. Then use the spray.  Do not use these medicine · Don’t have close contact with people who have sore throats, colds, or other upper respiratory infections. · Don’t smoke, and stay away from secondhand smoke. · Stay up to date with of your vaccines.   Date Last Reviewed: 11/1/2017  © 5765-8912 [employer]

## 2019-11-14 ENCOUNTER — TELEPHONE (OUTPATIENT)
Dept: INTERNAL MEDICINE CLINIC | Facility: CLINIC | Age: 64
End: 2019-11-14

## 2019-11-14 NOTE — TELEPHONE ENCOUNTER
Patient called in stated she is not due for her colonoscopy until Aug 2020     Alberta Gosselin informed.

## 2019-11-14 NOTE — TELEPHONE ENCOUNTER
Called patient to inform her that her insurance contacted us stating that she is due for her colonoscopy     Left VM and told patient to call office with any questions

## 2019-11-20 ENCOUNTER — OFFICE VISIT (OUTPATIENT)
Dept: SURGERY | Facility: CLINIC | Age: 64
End: 2019-11-20
Payer: COMMERCIAL

## 2019-11-20 VITALS
SYSTOLIC BLOOD PRESSURE: 154 MMHG | HEIGHT: 62 IN | BODY MASS INDEX: 27.79 KG/M2 | DIASTOLIC BLOOD PRESSURE: 73 MMHG | WEIGHT: 151 LBS | TEMPERATURE: 99 F | HEART RATE: 73 BPM

## 2019-11-20 DIAGNOSIS — N60.11 FIBROCYSTIC DISEASE OF RIGHT BREAST: Primary | ICD-10-CM

## 2019-11-20 DIAGNOSIS — Z80.3 FAMILY HISTORY OF BREAST CANCER IN SISTER: ICD-10-CM

## 2019-11-20 PROCEDURE — 99212 OFFICE O/P EST SF 10 MIN: CPT | Performed by: SURGERY

## 2019-11-20 NOTE — PROGRESS NOTES
Follow Up Visit Note       Active Problems      1. Fibrocystic disease of right breast    2.  Family history of breast cancer in sister          Chief Complaint   Patient presents with:  Breast Problem: mammogram f/u, states no issues or concerns,         H and Sexual Activity      Alcohol use: Yes        Frequency: Monthly or less        Comment: 1-2 drinks per month at the most      Drug use: No      Sexual activity: Not Currently        Partners: Male    Other Topics      Concerns:       Current Outpatient rash.   Neurological: Negative for tremors, syncope and weakness. Hematological: Negative for adenopathy. Does not bruise/bleed easily. Psychiatric/Behavioral: Negative for behavioral problems and sleep disturbance.         Physical Findings   /75 and exam.  · She will be due for a bilateral mammogram in October 2020. The order was placed at today's visit. · She should undergo a yearly breast examination, after her imaging. Follow Up  Return in about 1 year (around 11/20/2020).     Sylvain Shipley

## 2019-12-24 ENCOUNTER — OFFICE VISIT (OUTPATIENT)
Dept: INTERNAL MEDICINE CLINIC | Facility: CLINIC | Age: 64
End: 2019-12-24
Payer: COMMERCIAL

## 2019-12-24 VITALS
DIASTOLIC BLOOD PRESSURE: 84 MMHG | WEIGHT: 154.75 LBS | RESPIRATION RATE: 16 BRPM | BODY MASS INDEX: 27.08 KG/M2 | HEIGHT: 63.5 IN | HEART RATE: 89 BPM | OXYGEN SATURATION: 99 % | SYSTOLIC BLOOD PRESSURE: 138 MMHG | TEMPERATURE: 98 F

## 2019-12-24 DIAGNOSIS — Z00.00 ENCOUNTER FOR ROUTINE ADULT MEDICAL EXAMINATION: Primary | ICD-10-CM

## 2019-12-24 DIAGNOSIS — L98.9 SKIN ABNORMALITY: ICD-10-CM

## 2019-12-24 DIAGNOSIS — R73.01 ELEVATED FASTING GLUCOSE: ICD-10-CM

## 2019-12-24 DIAGNOSIS — Z00.00 LABORATORY EXAM ORDERED AS PART OF ROUTINE GENERAL MEDICAL EXAMINATION: ICD-10-CM

## 2019-12-24 DIAGNOSIS — E78.00 PURE HYPERCHOLESTEROLEMIA: ICD-10-CM

## 2019-12-24 PROCEDURE — 99396 PREV VISIT EST AGE 40-64: CPT | Performed by: INTERNAL MEDICINE

## 2019-12-24 RX ORDER — ATORVASTATIN CALCIUM 80 MG/1
80 TABLET, FILM COATED ORAL NIGHTLY
Qty: 90 TABLET | Refills: 3 | Status: SHIPPED | OUTPATIENT
Start: 2019-12-24

## 2019-12-24 NOTE — PROGRESS NOTES
[facility] Internal Medicine Office Note  Chief Complaint:   Patient presents with:  CPX: with pap      HPI:   This is a 60year old female coming in for physical  HPI  Not due for pap - normal 11/2018  Up to date with mammo    BP high on initial Tab Take 1 tablet (50 mg total) by mouth 2 (two) times daily. 90 tablet 1   • famoTIDine (PEPCID AC MAXIMUM STRENGTH) 20 MG Oral Tab Take 20 mg by mouth nightly as needed. • Lactobacillus-Inulin ([facility]) Take by mouth daily. normal mood and affect.        ASSESSMENT AND PLAN:   Angelika Lynne is a 60year old female with  Encounter for routine adult medical examination  (primary encounter diagnosis)  Skin abnormality  Elevated fasting glucose  Laboratory exam ordered as part o side effects or complications from the treatments as a result of today.      Mark Anthony Griffith MD

## 2019-12-27 ENCOUNTER — LAB ENCOUNTER (OUTPATIENT)
Dept: LAB | Age: 64
End: 2019-12-27
Attending: INTERNAL MEDICINE
Payer: COMMERCIAL

## 2019-12-27 DIAGNOSIS — Z00.00 LABORATORY EXAM ORDERED AS PART OF ROUTINE GENERAL MEDICAL EXAMINATION: ICD-10-CM

## 2019-12-27 DIAGNOSIS — R73.01 ELEVATED FASTING GLUCOSE: ICD-10-CM

## 2019-12-27 PROCEDURE — 80053 COMPREHEN METABOLIC PANEL: CPT

## 2019-12-27 PROCEDURE — 80061 LIPID PANEL: CPT

## 2019-12-27 PROCEDURE — 83036 HEMOGLOBIN GLYCOSYLATED A1C: CPT

## 2019-12-27 PROCEDURE — 84443 ASSAY THYROID STIM HORMONE: CPT

## 2019-12-27 PROCEDURE — 36415 COLL VENOUS BLD VENIPUNCTURE: CPT

## 2019-12-27 PROCEDURE — 85025 COMPLETE CBC W/AUTO DIFF WBC: CPT

## 2020-09-14 ENCOUNTER — LAB ENCOUNTER (OUTPATIENT)
Dept: LAB | Age: 65
End: 2020-09-14
Attending: INTERNAL MEDICINE
Payer: COMMERCIAL

## 2020-09-14 DIAGNOSIS — E78.5 DYSLIPIDEMIA: Primary | ICD-10-CM

## 2020-09-14 LAB
ALBUMIN SERPL-MCNC: 3.7 G/DL (ref 3.4–5)
ALBUMIN/GLOB SERPL: 1 {RATIO} (ref 1–2)
ALP LIVER SERPL-CCNC: 104 U/L (ref 50–130)
ALT SERPL-CCNC: 25 U/L (ref 13–56)
ANION GAP SERPL CALC-SCNC: 3 MMOL/L (ref 0–18)
AST SERPL-CCNC: 18 U/L (ref 15–37)
BILIRUB SERPL-MCNC: 0.8 MG/DL (ref 0.1–2)
BUN BLD-MCNC: 17 MG/DL (ref 7–18)
BUN/CREAT SERPL: 27.4 (ref 10–20)
CALCIUM BLD-MCNC: 9.7 MG/DL (ref 8.5–10.1)
CHLORIDE SERPL-SCNC: 109 MMOL/L (ref 98–112)
CHOLEST SMN-MCNC: 167 MG/DL (ref ?–200)
CO2 SERPL-SCNC: 30 MMOL/L (ref 21–32)
CREAT BLD-MCNC: 0.62 MG/DL (ref 0.55–1.02)
GLOBULIN PLAS-MCNC: 3.7 G/DL (ref 2.8–4.4)
GLUCOSE BLD-MCNC: 109 MG/DL (ref 70–99)
HDLC SERPL-MCNC: 67 MG/DL (ref 40–59)
LDLC SERPL CALC-MCNC: 79 MG/DL (ref ?–100)
M PROTEIN MFR SERPL ELPH: 7.4 G/DL (ref 6.4–8.2)
NONHDLC SERPL-MCNC: 100 MG/DL (ref ?–130)
OSMOLALITY SERPL CALC.SUM OF ELEC: 296 MOSM/KG (ref 275–295)
PATIENT FASTING Y/N/NP: YES
PATIENT FASTING Y/N/NP: YES
POTASSIUM SERPL-SCNC: 3.7 MMOL/L (ref 3.5–5.1)
SODIUM SERPL-SCNC: 142 MMOL/L (ref 136–145)
TRIGL SERPL-MCNC: 105 MG/DL (ref 30–149)
VLDLC SERPL CALC-MCNC: 21 MG/DL (ref 0–30)

## 2020-09-14 PROCEDURE — 80053 COMPREHEN METABOLIC PANEL: CPT

## 2020-09-14 PROCEDURE — 80061 LIPID PANEL: CPT

## 2020-09-14 PROCEDURE — 36415 COLL VENOUS BLD VENIPUNCTURE: CPT

## 2020-10-06 ENCOUNTER — MED REC SCAN ONLY (OUTPATIENT)
Dept: SURGERY | Facility: CLINIC | Age: 65
End: 2020-10-06

## 2020-10-06 ENCOUNTER — TELEPHONE (OUTPATIENT)
Dept: INTERNAL MEDICINE CLINIC | Facility: CLINIC | Age: 65
End: 2020-10-06

## 2020-10-06 NOTE — TELEPHONE ENCOUNTER
incoming fax from [facility] with Mammogram results     No mammographic evidence of malignancy     Placed in Dr. Yifan Candelaria in basket for further review

## 2020-10-26 ENCOUNTER — OFFICE VISIT (OUTPATIENT)
Dept: SURGERY | Facility: CLINIC | Age: 65
End: 2020-10-26
Payer: COMMERCIAL

## 2020-10-26 VITALS
SYSTOLIC BLOOD PRESSURE: 160 MMHG | HEART RATE: 76 BPM | WEIGHT: 150 LBS | BODY MASS INDEX: 26.58 KG/M2 | DIASTOLIC BLOOD PRESSURE: 78 MMHG | HEIGHT: 63 IN | TEMPERATURE: 97 F

## 2020-10-26 DIAGNOSIS — Z80.3 FAMILY HISTORY OF BREAST CANCER IN SISTER: ICD-10-CM

## 2020-10-26 DIAGNOSIS — N60.11 FIBROCYSTIC DISEASE OF RIGHT BREAST: Primary | ICD-10-CM

## 2020-10-26 PROCEDURE — 3077F SYST BP >= 140 MM HG: CPT | Performed by: SURGERY

## 2020-10-26 PROCEDURE — 3008F BODY MASS INDEX DOCD: CPT | Performed by: SURGERY

## 2020-10-26 PROCEDURE — 99212 OFFICE O/P EST SF 10 MIN: CPT | Performed by: SURGERY

## 2020-10-26 PROCEDURE — 3078F DIAST BP <80 MM HG: CPT | Performed by: SURGERY

## 2020-10-26 NOTE — PROGRESS NOTES
Follow Up Visit Note       Active Problems      1. Fibrocystic disease of right breast    2. Family history of breast cancer in sister          Chief Complaint   Patient presents with: Follow Up To Mammogram: EST PT f/u after mamm on 10/11.  PT denies any Smoking status: Never Smoker      Smokeless tobacco: Never Used    Substance and Sexual Activity      Alcohol use: Yes        Frequency: Monthly or less        Comment: 1-2 drinks per month at the most      Drug use: No      Sexual activity: Not Currently Negative for arthralgias and myalgias. Skin: Negative for color change and rash. Neurological: Negative for tremors, syncope and weakness. Hematological: Negative for adenopathy. Does not bruise/bleed easily.    Psychiatric/Behavioral: Negative for be entered into a reminder system with a target for the patient's next mammogram. The patient has been or will be notified of the results.     BREAST CANCER RISK ASSESSMENT SUMMARY    The specific findings are as follows: HIGH MUTATION AND HIGH LIFETIME RISK -

## 2021-03-12 DIAGNOSIS — Z23 NEED FOR VACCINATION: ICD-10-CM

## 2021-05-27 DIAGNOSIS — I10 ESSENTIAL HYPERTENSION: ICD-10-CM

## 2021-05-27 RX ORDER — METOPROLOL TARTRATE 50 MG/1
TABLET, FILM COATED ORAL
Qty: 90 TABLET | Refills: 1 | OUTPATIENT
Start: 2021-05-27

## 2021-05-27 NOTE — TELEPHONE ENCOUNTER
Due for OV - has not been seen in over 1 year. Newsy message sent to pt. Failed protocol     Last refill:  Metoprolol Tartrate 50 MG Oral Tab 90 tablet 1 4/4/2019    Sig:   Take 1 tablet (50 mg total) by mouth 2 (two) times daily.          LOV:   12

## 2021-08-30 ENCOUNTER — LAB ENCOUNTER (OUTPATIENT)
Dept: LAB | Age: 66
End: 2021-08-30
Attending: INTERNAL MEDICINE
Payer: COMMERCIAL

## 2021-08-30 DIAGNOSIS — Z01.818 PRE-OP TESTING: ICD-10-CM

## 2021-08-30 DIAGNOSIS — Z01.818 PREOP TESTING: ICD-10-CM

## 2021-08-31 LAB — SARS-COV-2 RNA RESP QL NAA+PROBE: NOT DETECTED

## 2021-09-02 PROBLEM — Z86.010 HISTORY OF ADENOMATOUS POLYP OF COLON: Status: ACTIVE | Noted: 2021-09-02

## 2021-09-02 PROBLEM — Z86.0101 HISTORY OF ADENOMATOUS POLYP OF COLON: Status: ACTIVE | Noted: 2021-09-02

## 2021-10-27 ENCOUNTER — OFFICE VISIT (OUTPATIENT)
Dept: SURGERY | Facility: CLINIC | Age: 66
End: 2021-10-27
Payer: COMMERCIAL

## 2021-10-27 VITALS — WEIGHT: 150 LBS | HEIGHT: 63 IN | TEMPERATURE: 99 F | BODY MASS INDEX: 26.58 KG/M2

## 2021-10-27 DIAGNOSIS — N60.11 FIBROCYSTIC DISEASE OF RIGHT BREAST: Primary | ICD-10-CM

## 2021-10-27 DIAGNOSIS — Z80.3 FAMILY HISTORY OF BREAST CANCER IN SISTER: ICD-10-CM

## 2021-10-27 PROCEDURE — 3008F BODY MASS INDEX DOCD: CPT | Performed by: SURGERY

## 2021-10-27 PROCEDURE — 99212 OFFICE O/P EST SF 10 MIN: CPT | Performed by: SURGERY

## 2021-10-27 NOTE — PROGRESS NOTES
Subjective:   Patient ID: Milly Song is a 61year old female known to me for prior benign right breast biopsy, performed July 29, 2011.  She has a family history of breast cancer in 2 sisters. Shae Villarreals older sister underwent genetic testing, and she is BR Vascular: No JVD. Trachea: Trachea normal.   Chest:      Chest wall: No mass. Breasts: Breasts are symmetrical.      Right: No inverted nipple, mass, nipple discharge, skin change, tenderness or axillary adenopathy.       Left: No inverted nipple, m

## 2021-11-10 ENCOUNTER — OFFICE VISIT (OUTPATIENT)
Dept: INTERNAL MEDICINE CLINIC | Facility: CLINIC | Age: 66
End: 2021-11-10
Payer: COMMERCIAL

## 2021-11-10 VITALS
OXYGEN SATURATION: 99 % | TEMPERATURE: 98 F | SYSTOLIC BLOOD PRESSURE: 138 MMHG | WEIGHT: 156.19 LBS | HEIGHT: 63.25 IN | BODY MASS INDEX: 27.33 KG/M2 | RESPIRATION RATE: 16 BRPM | DIASTOLIC BLOOD PRESSURE: 82 MMHG | HEART RATE: 77 BPM

## 2021-11-10 DIAGNOSIS — R73.01 ELEVATED FASTING GLUCOSE: ICD-10-CM

## 2021-11-10 DIAGNOSIS — E78.00 PURE HYPERCHOLESTEROLEMIA: ICD-10-CM

## 2021-11-10 DIAGNOSIS — Z00.00 LABORATORY EXAM ORDERED AS PART OF ROUTINE GENERAL MEDICAL EXAMINATION: ICD-10-CM

## 2021-11-10 DIAGNOSIS — Z00.00 ENCOUNTER FOR ROUTINE ADULT MEDICAL EXAMINATION: Primary | ICD-10-CM

## 2021-11-10 DIAGNOSIS — Z12.4 SCREENING FOR CERVICAL CANCER: ICD-10-CM

## 2021-11-10 PROCEDURE — 88175 CYTOPATH C/V AUTO FLUID REDO: CPT | Performed by: INTERNAL MEDICINE

## 2021-11-10 PROCEDURE — 87624 HPV HI-RISK TYP POOLED RSLT: CPT | Performed by: INTERNAL MEDICINE

## 2021-11-10 PROCEDURE — 3008F BODY MASS INDEX DOCD: CPT | Performed by: INTERNAL MEDICINE

## 2021-11-10 PROCEDURE — 3079F DIAST BP 80-89 MM HG: CPT | Performed by: INTERNAL MEDICINE

## 2021-11-10 PROCEDURE — 99396 PREV VISIT EST AGE 40-64: CPT | Performed by: INTERNAL MEDICINE

## 2021-11-10 PROCEDURE — 3075F SYST BP GE 130 - 139MM HG: CPT | Performed by: INTERNAL MEDICINE

## 2021-11-10 NOTE — PROGRESS NOTES
Cardiologist - Dr. Pacheco Crandall fax [phone]  [facility] Internal Medicine Office Note  Chief Complaint:   Patient presents with:  Physical      HPI:   This is a 61year old female coming in for physical and pap   HPI    HL - on statin  Sees OR) Take by mouth daily. • Omega-3 Fatty Acids (FISH OIL) 1200 MG Oral Cap Take by mouth daily. • Lactobacillus-Inulin ([facility]) Take by mouth daily.              REVIEW OF SYSTEMS:   Review of Systems   Constitutional: Ne Skin:     General: Skin is warm and dry. Neurological:      General: No focal deficit present. Mental Status: She is alert.    Psychiatric:         Mood and Affect: Mood normal.          ASSESSMENT AND PLAN:   Maribell Bourgeois is a 61year old femal any questions, complications, allergies, or worsening or changing symptoms. Patient is to call with any side effects or complications from the treatments as a result of today.      Dasha Love MD

## 2021-11-20 ENCOUNTER — LAB ENCOUNTER (OUTPATIENT)
Dept: LAB | Age: 66
End: 2021-11-20
Attending: INTERNAL MEDICINE
Payer: COMMERCIAL

## 2021-11-20 DIAGNOSIS — Z00.00 LABORATORY EXAM ORDERED AS PART OF ROUTINE GENERAL MEDICAL EXAMINATION: ICD-10-CM

## 2021-11-20 DIAGNOSIS — R73.01 ELEVATED FASTING GLUCOSE: ICD-10-CM

## 2021-11-20 PROCEDURE — 83036 HEMOGLOBIN GLYCOSYLATED A1C: CPT

## 2021-11-20 PROCEDURE — 80053 COMPREHEN METABOLIC PANEL: CPT

## 2021-11-20 PROCEDURE — 36415 COLL VENOUS BLD VENIPUNCTURE: CPT

## 2021-11-20 PROCEDURE — 84443 ASSAY THYROID STIM HORMONE: CPT

## 2021-11-20 PROCEDURE — 80061 LIPID PANEL: CPT

## 2021-11-20 PROCEDURE — 85025 COMPLETE CBC W/AUTO DIFF WBC: CPT

## 2022-04-10 ENCOUNTER — TELEPHONE (OUTPATIENT)
Dept: INTERNAL MEDICINE CLINIC | Facility: CLINIC | Age: 67
End: 2022-04-10

## 2022-04-10 NOTE — TELEPHONE ENCOUNTER
Received call - patient started having symptoms 4 days ago - tested positive for home COVID test today. Currently with cold, nasal symptoms. Received 4th booster shot last week. Recommended supportive therapy for now, isolation through Tuesday, ok to discontinue isolation as long as symptoms improved by Wednesday. Asking about antiviral therapy - recommend holding off in light of interaction with her statin. Advised her to call office if symptoms persist/worsen. Patient voices understanding of recommendations.

## 2022-05-23 RX ORDER — ATORVASTATIN CALCIUM 80 MG/1
80 TABLET, FILM COATED ORAL NIGHTLY
Qty: 90 TABLET | Refills: 3 | Status: SHIPPED | OUTPATIENT
Start: 2022-05-23

## 2022-05-23 NOTE — TELEPHONE ENCOUNTER
Protocol passed     Requesting: atorvastatin 80mg     LOV: 11/10/21   Pure hypercholesterolemia - cont statin; check labs   RTC: 1 yr   Filled: 12/24/19 #90 3 refills   Recent Labs: 11/20/21     Upcoming OV: none scheduled

## 2023-03-31 ENCOUNTER — OFFICE VISIT (OUTPATIENT)
Dept: INTERNAL MEDICINE CLINIC | Facility: CLINIC | Age: 68
End: 2023-03-31
Payer: MEDICARE

## 2023-03-31 VITALS
HEART RATE: 78 BPM | WEIGHT: 163 LBS | SYSTOLIC BLOOD PRESSURE: 142 MMHG | TEMPERATURE: 98 F | DIASTOLIC BLOOD PRESSURE: 74 MMHG | HEIGHT: 63 IN | RESPIRATION RATE: 16 BRPM | BODY MASS INDEX: 28.88 KG/M2 | OXYGEN SATURATION: 97 %

## 2023-03-31 DIAGNOSIS — K21.9 GASTROESOPHAGEAL REFLUX DISEASE WITHOUT ESOPHAGITIS: ICD-10-CM

## 2023-03-31 DIAGNOSIS — Z80.3 FAMILY HISTORY OF BREAST CANCER IN SISTER: ICD-10-CM

## 2023-03-31 DIAGNOSIS — B00.1 RECURRENT COLD SORES: ICD-10-CM

## 2023-03-31 DIAGNOSIS — Z00.00 WELLNESS EXAMINATION: Primary | ICD-10-CM

## 2023-03-31 DIAGNOSIS — Z78.0 POSTMENOPAUSAL ESTROGEN DEFICIENCY: ICD-10-CM

## 2023-03-31 DIAGNOSIS — Z12.31 SCREENING MAMMOGRAM FOR BREAST CANCER: ICD-10-CM

## 2023-03-31 DIAGNOSIS — N60.19 FIBROCYSTIC BREAST DISEASE (FCBD), UNSPECIFIED LATERALITY: ICD-10-CM

## 2023-03-31 DIAGNOSIS — I10 ESSENTIAL HYPERTENSION: ICD-10-CM

## 2023-03-31 DIAGNOSIS — C44.91 BASAL CELL CARCINOMA (BCC), UNSPECIFIED SITE: ICD-10-CM

## 2023-03-31 DIAGNOSIS — E78.00 PURE HYPERCHOLESTEROLEMIA: ICD-10-CM

## 2023-03-31 DIAGNOSIS — R73.01 ELEVATED FASTING GLUCOSE: ICD-10-CM

## 2023-03-31 RX ORDER — FLUOROURACIL 50 MG/G
CREAM TOPICAL
COMMUNITY
Start: 2022-12-13 | End: 2023-03-31

## 2023-03-31 RX ORDER — ACYCLOVIR 50 MG/G
OINTMENT TOPICAL
COMMUNITY
Start: 2022-10-11 | End: 2023-03-31

## 2023-03-31 RX ORDER — VALACYCLOVIR HYDROCHLORIDE 1 G/1
TABLET, FILM COATED ORAL
Qty: 20 TABLET | Refills: 0 | Status: SHIPPED | OUTPATIENT
Start: 2023-03-31

## 2023-03-31 RX ORDER — POLYMYXIN B SULFATE AND TRIMETHOPRIM 1; 10000 MG/ML; [USP'U]/ML
SOLUTION OPHTHALMIC
COMMUNITY
Start: 2022-10-29 | End: 2023-03-31

## 2023-03-31 RX ORDER — ATORVASTATIN CALCIUM 80 MG/1
80 TABLET, FILM COATED ORAL NIGHTLY
Qty: 90 TABLET | Refills: 3 | Status: SHIPPED | OUTPATIENT
Start: 2023-03-31

## 2023-03-31 RX ORDER — METOPROLOL TARTRATE 50 MG/1
50 TABLET, FILM COATED ORAL 2 TIMES DAILY
Qty: 90 TABLET | Refills: 3 | Status: SHIPPED | OUTPATIENT
Start: 2023-03-31

## 2023-03-31 NOTE — PATIENT INSTRUCTIONS
Schedule nurse visit appointment for Prevnar 20 or get at your local pharmacy. Shingrix shingles vaccines recommended. 2nd dose is 2-6 months. Get at pharmacy.     Hepatitis A and B combo vaccine - 0, 1, and 6 months (3 doses) - Schedule nurse visit appointment or get at pharmacy         Blood work     Call to schedule bone density (DEXA)   Mammogram due in October     Please send me some home blood pressure readings next week

## 2023-04-04 ENCOUNTER — LAB ENCOUNTER (OUTPATIENT)
Dept: LAB | Age: 68
End: 2023-04-04
Attending: INTERNAL MEDICINE
Payer: MEDICARE

## 2023-04-04 DIAGNOSIS — E78.00 PURE HYPERCHOLESTEROLEMIA: ICD-10-CM

## 2023-04-04 DIAGNOSIS — R73.01 ELEVATED FASTING GLUCOSE: ICD-10-CM

## 2023-04-04 LAB
ALBUMIN SERPL-MCNC: 3.7 G/DL (ref 3.4–5)
ALBUMIN/GLOB SERPL: 0.9 {RATIO} (ref 1–2)
ALP LIVER SERPL-CCNC: 99 U/L
ALT SERPL-CCNC: 36 U/L
ANION GAP SERPL CALC-SCNC: 4 MMOL/L (ref 0–18)
AST SERPL-CCNC: 31 U/L (ref 15–37)
BILIRUB SERPL-MCNC: 0.7 MG/DL (ref 0.1–2)
BUN BLD-MCNC: 17 MG/DL (ref 7–18)
CALCIUM BLD-MCNC: 9.1 MG/DL (ref 8.5–10.1)
CHLORIDE SERPL-SCNC: 110 MMOL/L (ref 98–112)
CHOLEST SERPL-MCNC: 170 MG/DL (ref ?–200)
CO2 SERPL-SCNC: 27 MMOL/L (ref 21–32)
CREAT BLD-MCNC: 0.78 MG/DL
EST. AVERAGE GLUCOSE BLD GHB EST-MCNC: 123 MG/DL (ref 68–126)
FASTING PATIENT LIPID ANSWER: YES
FASTING STATUS PATIENT QL REPORTED: YES
GFR SERPLBLD BASED ON 1.73 SQ M-ARVRAT: 84 ML/MIN/1.73M2 (ref 60–?)
GLOBULIN PLAS-MCNC: 4 G/DL (ref 2.8–4.4)
GLUCOSE BLD-MCNC: 121 MG/DL (ref 70–99)
HBA1C MFR BLD: 5.9 % (ref ?–5.7)
HDLC SERPL-MCNC: 64 MG/DL (ref 40–59)
LDLC SERPL CALC-MCNC: 92 MG/DL (ref ?–100)
NONHDLC SERPL-MCNC: 106 MG/DL (ref ?–130)
OSMOLALITY SERPL CALC.SUM OF ELEC: 295 MOSM/KG (ref 275–295)
POTASSIUM SERPL-SCNC: 4.2 MMOL/L (ref 3.5–5.1)
PROT SERPL-MCNC: 7.7 G/DL (ref 6.4–8.2)
SODIUM SERPL-SCNC: 141 MMOL/L (ref 136–145)
TRIGL SERPL-MCNC: 74 MG/DL (ref 30–149)
VLDLC SERPL CALC-MCNC: 12 MG/DL (ref 0–30)

## 2023-04-04 PROCEDURE — 80061 LIPID PANEL: CPT

## 2023-04-04 PROCEDURE — 36415 COLL VENOUS BLD VENIPUNCTURE: CPT

## 2023-04-04 PROCEDURE — 83036 HEMOGLOBIN GLYCOSYLATED A1C: CPT

## 2023-04-04 PROCEDURE — 80053 COMPREHEN METABOLIC PANEL: CPT

## 2023-04-26 DIAGNOSIS — I10 ESSENTIAL HYPERTENSION: ICD-10-CM

## 2023-04-28 RX ORDER — METOPROLOL TARTRATE 50 MG/1
50 TABLET, FILM COATED ORAL 2 TIMES DAILY
Qty: 180 TABLET | Refills: 3 | Status: SHIPPED | OUTPATIENT
Start: 2023-04-28

## 2023-06-03 ENCOUNTER — TELEMEDICINE (OUTPATIENT)
Dept: INTERNAL MEDICINE CLINIC | Facility: CLINIC | Age: 68
End: 2023-06-03
Payer: MEDICARE

## 2023-06-03 DIAGNOSIS — R30.0 DYSURIA: Primary | ICD-10-CM

## 2023-06-03 PROCEDURE — 1159F MED LIST DOCD IN RCRD: CPT | Performed by: INTERNAL MEDICINE

## 2023-06-03 PROCEDURE — 99441 PHONE E/M BY PHYS 5-10 MIN: CPT | Performed by: INTERNAL MEDICINE

## 2023-06-03 PROCEDURE — 1160F RVW MEDS BY RX/DR IN RCRD: CPT | Performed by: INTERNAL MEDICINE

## 2023-06-03 RX ORDER — NITROFURANTOIN 25; 75 MG/1; MG/1
100 CAPSULE ORAL 2 TIMES DAILY
Qty: 10 CAPSULE | Refills: 0 | Status: SHIPPED | OUTPATIENT
Start: 2023-06-03

## 2023-06-03 NOTE — PROGRESS NOTES
Virtual Telephone Check-In    Brigid Pantoja verbally consents to a Virtual/Telephone Check-In visit on 06/03/23. Patient has been referred to the [facility] website at [email or web address] to review the yearly Consent to Treat document. Patient understands and accepts financial responsibility for any deductible, co-insurance and/or co-pays associated with this service. Duration of the service: 5 minutes      Summary of topics discussed:   Burning with urination since Thursday and urinary frequency. Diagnoses and all orders for this visit:    Dysuria - symptoms consistent with UTI. Start nitrofurantoin. Go to lab for urine culture on Monday only if symptoms persist   -     URINE CULTURE, ROUTINE; Future    Other orders  -     nitrofurantoin monohydrate macro 100 MG Oral Cap; Take 1 capsule (100 mg total) by mouth 2 (two) times daily.       Rose Mary Li MD

## 2023-08-29 ENCOUNTER — PATIENT MESSAGE (OUTPATIENT)
Dept: INTERNAL MEDICINE CLINIC | Facility: CLINIC | Age: 68
End: 2023-08-29

## 2023-08-29 DIAGNOSIS — Z78.0 POSTMENOPAUSAL ESTROGEN DEFICIENCY: Primary | ICD-10-CM

## 2023-09-08 ENCOUNTER — APPOINTMENT (OUTPATIENT)
Dept: URGENT CARE | Age: 68
End: 2023-09-08

## 2023-09-08 RX ORDER — NITROFURANTOIN 25; 75 MG/1; MG/1
100 CAPSULE ORAL 2 TIMES DAILY
Qty: 10 CAPSULE | Refills: 0 | OUTPATIENT
Start: 2023-09-08

## 2023-09-08 RX ORDER — NITROFURANTOIN 25; 75 MG/1; MG/1
100 CAPSULE ORAL 2 TIMES DAILY
Qty: 10 CAPSULE | Refills: 0 | Status: SHIPPED | OUTPATIENT
Start: 2023-09-08 | End: 2023-09-13

## 2023-09-08 NOTE — TELEPHONE ENCOUNTER
Spoke to Dr Tomi Holm and received verbal order for antibiotic Nitrofurantoin 100 mg bid for 5 days. PCP also requesting office visit for recurrent symptoms. Order sent to [facility]. Attempted to call patient to notify however no answer. Advised [facility] will be sent and to please read.

## 2023-09-08 NOTE — TELEPHONE ENCOUNTER
Requested Prescriptions     Pending Prescriptions Disp Refills    NITROFURANTOIN MONOHYDRATE MACRO 100 MG Oral Cap [Pharmacy Med Name: Nitrofurantoin Monohyd Macro 100 MG Oral Capsule] 10 capsule 0     Sig: Take 1 capsule by mouth twice daily     No protocol     LOV 3/31/23   Last VV 6/3/23  Filled 6/3/23 10 caps 0 refills  No future appointments. Pt to do urine culture if s/s persisted.

## 2023-11-30 ENCOUNTER — TELEPHONE (OUTPATIENT)
Dept: INTERNAL MEDICINE CLINIC | Facility: CLINIC | Age: 68
End: 2023-11-30

## 2023-11-30 NOTE — TELEPHONE ENCOUNTER
Pt calling because she is receiving letters to have imaging completed but pt already had it completed at [address] [place] [address]. Pt had mammogram done on 10/23 and DEXA 11/16. Pt stated it's available via care everywhere, please update records.

## 2023-12-04 NOTE — TELEPHONE ENCOUNTER
Spoke to pt, informed of Dexa results and recommendations. Pt v/u. [place], but we scheduled her AWV:    Future Appointments   Date Time Provider Marcy Haro   2/12/2024  1:00 PM Richy Patel MD [facility] [place]     Pt did not know what a Supervisit is, she is new to Medicare. RN informed pt we will call her back if LS says AWV is not correct. Pt v/u.

## 2023-12-04 NOTE — TELEPHONE ENCOUNTER
Please let patient know her bone density has worsened compared to previous and is in osteoporosis range at the left hip. Other readings are in osteopenia range.  We will discuss these results at her next appointment - can plan to schedule wellness exam (supervisit) for Jan/Feb and discuss the bone density further at this appointment         11/16/23 DEXA:  AP spine T score -2.0, prev -0.6  L hip T score -2.5, prev -1.4  Total hip T score -1.8, prev -0.5  Right total hip -2.4, prev -1.7  Total hip -1.9, prev -1.1  10 year probability of fracture: major osteoporotic fracture is 13% and hip fracture 2.7%

## 2024-02-12 ENCOUNTER — OFFICE VISIT (OUTPATIENT)
Dept: INTERNAL MEDICINE CLINIC | Facility: CLINIC | Age: 69
End: 2024-02-12
Payer: MEDICARE

## 2024-02-12 VITALS
WEIGHT: 165.19 LBS | BODY MASS INDEX: 29.27 KG/M2 | OXYGEN SATURATION: 97 % | DIASTOLIC BLOOD PRESSURE: 74 MMHG | HEIGHT: 63 IN | RESPIRATION RATE: 16 BRPM | HEART RATE: 80 BPM | SYSTOLIC BLOOD PRESSURE: 130 MMHG | TEMPERATURE: 98 F

## 2024-02-12 DIAGNOSIS — B00.1 RECURRENT COLD SORES: ICD-10-CM

## 2024-02-12 DIAGNOSIS — E78.00 PURE HYPERCHOLESTEROLEMIA: ICD-10-CM

## 2024-02-12 DIAGNOSIS — R73.01 ELEVATED FASTING GLUCOSE: ICD-10-CM

## 2024-02-12 DIAGNOSIS — G89.29 CHRONIC BILATERAL LOW BACK PAIN WITH LEFT-SIDED SCIATICA: ICD-10-CM

## 2024-02-12 DIAGNOSIS — Z86.010 HISTORY OF ADENOMATOUS POLYP OF COLON: ICD-10-CM

## 2024-02-12 DIAGNOSIS — Z00.00 WELLNESS EXAMINATION: Primary | ICD-10-CM

## 2024-02-12 DIAGNOSIS — I10 ESSENTIAL HYPERTENSION: ICD-10-CM

## 2024-02-12 DIAGNOSIS — M54.42 CHRONIC BILATERAL LOW BACK PAIN WITH LEFT-SIDED SCIATICA: ICD-10-CM

## 2024-02-12 DIAGNOSIS — K21.9 GASTROESOPHAGEAL REFLUX DISEASE WITHOUT ESOPHAGITIS: ICD-10-CM

## 2024-02-12 DIAGNOSIS — Z80.3 FAMILY HISTORY OF BREAST CANCER IN SISTER: ICD-10-CM

## 2024-02-12 DIAGNOSIS — Z85.828 HISTORY OF BASAL CELL CARCINOMA: ICD-10-CM

## 2024-02-12 DIAGNOSIS — N60.19 FIBROCYSTIC BREAST DISEASE (FCBD), UNSPECIFIED LATERALITY: ICD-10-CM

## 2024-02-12 DIAGNOSIS — M81.0 AGE-RELATED OSTEOPOROSIS WITHOUT CURRENT PATHOLOGICAL FRACTURE: ICD-10-CM

## 2024-02-12 PROCEDURE — 3075F SYST BP GE 130 - 139MM HG: CPT | Performed by: INTERNAL MEDICINE

## 2024-02-12 PROCEDURE — G0438 PPPS, INITIAL VISIT: HCPCS | Performed by: INTERNAL MEDICINE

## 2024-02-12 PROCEDURE — 99499 UNLISTED E&M SERVICE: CPT | Performed by: INTERNAL MEDICINE

## 2024-02-12 PROCEDURE — 99213 OFFICE O/P EST LOW 20 MIN: CPT | Performed by: INTERNAL MEDICINE

## 2024-02-12 PROCEDURE — 3078F DIAST BP <80 MM HG: CPT | Performed by: INTERNAL MEDICINE

## 2024-02-12 PROCEDURE — 3008F BODY MASS INDEX DOCD: CPT | Performed by: INTERNAL MEDICINE

## 2024-02-12 PROCEDURE — 96160 PT-FOCUSED HLTH RISK ASSMT: CPT | Performed by: INTERNAL MEDICINE

## 2024-02-12 RX ORDER — ATORVASTATIN CALCIUM 80 MG/1
80 TABLET, FILM COATED ORAL NIGHTLY
Qty: 90 TABLET | Refills: 3 | Status: SHIPPED | OUTPATIENT
Start: 2024-02-12

## 2024-02-12 RX ORDER — METOPROLOL TARTRATE 50 MG/1
50 TABLET, FILM COATED ORAL 2 TIMES DAILY
Qty: 180 TABLET | Refills: 3 | Status: SHIPPED | OUTPATIENT
Start: 2024-02-12

## 2024-02-12 NOTE — PROGRESS NOTES
Subjective:   Nuvia Balbuena is a 68 year old female who presents for a MA (Medicare Advantage) Supervisit (Once per calendar year) and scheduled follow up of multiple significant but stable problems and acute uncomplicated problem.     Tightness in her low back/buttock area   Started Dec 23 and improved but   Left sided sciatica   She notes she has been exercising daily with a water aerobics and thinks it is related     Osteoporosis seen on DEXA     HL- on statin     Declines shingrix and flu shots  Mammo and colon ca screening are up to date     History/Other:   Fall Risk Assessment:   She has been screened for Falls and is low risk.      Cognitive Assessment:   She had a completely normal cognitive assessment - see flowsheet entries     Functional Ability/Status:   Nuvia Balbuena has a completely normal functional assessment. See flowsheet for details.      Depression Screening (PHQ-2/PHQ-9): PHQ-2 SCORE: 0  , done 2/9/2024   Last [place] Suicide Screening on 2/12/2024 was No Risk.         Advanced Directives:   She does NOT have a Living Will. [Do you have a living will?: No]  She does NOT have a Power of  for Health Care. [Do you have a healthcare power of ?: No]  Not discussed      Patient Active Problem List   Diagnosis    Fibrocystic breast disease    Hyperlipidemia    Essential hypertension    Esophageal reflux    Family history of breast cancer in sister    History of adenomatous polyp of colon    Recurrent cold sores    Elevated fasting glucose    Basal cell carcinoma (BCC)     Allergies:  She is allergic to penicillins.    Current Medications:  Outpatient Medications Marked as Taking for the 2/12/24 encounter (Office Visit) with Debbie Manley MD   Medication Sig    atorvastatin 80 MG Oral Tab Take 1 tablet (80 mg total) by mouth nightly.    metoprolol tartrate 50 MG Oral Tab Take 1 tablet (50 mg total) by mouth 2 (two) times daily.    Probiotic Product (PROBIOTIC DAILY OR) Take by  mouth.    valACYclovir 1 G Oral Tab Take 2 tablets po every 12 hours x1 day. Start at the onset of symptoms.    famotidine 20 MG Oral Tab Take 1 tablet (20 mg total) by mouth nightly as needed.    Ascorbic Acid (VITAMIN C) 1000 MG Oral Tab Take 1 tablet (1,000 mg total) by mouth daily.    Multiple Vitamins-Minerals (MULTIVITAMIN OR) Take by mouth daily.         Medical History:  She  has a past medical history of Allergic rhinitis, Esophageal reflux, Essential hypertension, Fibrocystic breast disease (11/06/2014), Heartburn (2015), Hemorrhoids (1990), High cholesterol, History of cardiac murmur (2019), Hyperlipidemia, Indigestion (2015), Pain in joints (2016), and Wears glasses (2000).  Surgical History:  She  has a past surgical history that includes Breast biopsy (08/2011); colonoscopy (08/2015); and skin surgery (10/2022).   Family History:  Her family history includes Breast Cancer in her sister and sister; Heart Attack in her father; Hypertension in her brother and sister; Mental Disorder in her daughter; Stroke in her father.  Social History:  She  reports that she has never smoked. She has never used smokeless tobacco. She reports current alcohol use. She reports that she does not use drugs.    Tobacco:  She has never smoked tobacco.    CAGE Alcohol Screen:   CAGE screening score of 0 on 2/9/2024, showing low risk of alcohol abuse.      Patient Care Team:  Debbie Manley MD as PCP - General (Internal Medicine)  Adalberto Blanton PT as Physical Therapist (Physical Therapy)    Review of Systems   Constitutional:  Negative for fever.   HENT:  Negative for congestion.    Eyes:  Negative for visual disturbance.   Respiratory:  Negative for shortness of breath.    Cardiovascular:  Negative for chest pain.   Gastrointestinal:  Negative for constipation.   Genitourinary:  Negative for dysuria.   Neurological: Negative.    Hematological: Negative.    Psychiatric/Behavioral: Negative.           Objective:   Physical  Exam  Vitals reviewed.   Constitutional:       General: She is not in acute distress.     Appearance: She is well-developed.   HENT:      Head: Normocephalic and atraumatic.      Right Ear: Tympanic membrane normal.      Left Ear: Tympanic membrane normal.   Eyes:      Conjunctiva/sclera: Conjunctivae normal.   Cardiovascular:      Rate and Rhythm: Normal rate and regular rhythm.      Heart sounds: Normal heart sounds.   Pulmonary:      Effort: Pulmonary effort is normal.      Breath sounds: Normal breath sounds.   Musculoskeletal:      Cervical back: Neck supple.      Right lower leg: No edema.      Left lower leg: No edema.   Lymphadenopathy:      Cervical: No cervical adenopathy.   Skin:     General: Skin is warm and dry.   Neurological:      General: No focal deficit present.      Mental Status: She is alert.   Psychiatric:         Mood and Affect: Mood normal.           /74 (BP Location: Right arm, Patient Position: Sitting, Cuff Size: adult)   Pulse 80   Temp 98.1 °F (36.7 °C) (Temporal)   Resp 16   Ht 5' 3\" (1.6 m)   Wt 165 lb 3.2 oz (74.9 kg)   SpO2 97%   BMI 29.26 kg/m²  Estimated body mass index is 29.26 kg/m² as calculated from the following:    Height as of this encounter: 5' 3\" (1.6 m).    Weight as of this encounter: 165 lb 3.2 oz (74.9 kg).    Medicare Hearing Assessment:   Hearing Screening    Time taken: 2/12/2024  1:03 PM  Entry User: Kaylynn Antoine RN  Screening Method: Whisper Test  Whisper Test Result: Pass                     Assessment & Plan:   Nuvia Balbuena is a 68 year old female who presents for a Medicare Assessment.     1. Wellness examination (Primary)  -mammo due 10/2024  -colonoscopy due 9/2026  -flu shot, prevnar and shingrix recommended   2. Chronic bilateral low back pain with left-sided sciatica -new problem. No trauma. Will check X-ray and plan to start physical therapy   -     Physical Therapy Referral - [facility] Location  -     XR LUMBAR SPINE (MIN 4 VIEWS)  (CPT=72110); Future; Expected date: 02/12/2024  3. Elevated fasting glucose -check A1c   -     Hemoglobin A1C; Future; Expected date: 02/12/2024  4. Pure hypercholesterolemia -cont statin; check labs  -     Lipid Panel; Future; Expected date: 02/12/2024  -     Comp Metabolic Panel (14); Future; Expected date: 02/12/2024  5. Essential hypertension -at goal; cont present management   -     Metoprolol Tartrate; Take 1 tablet (50 mg total) by mouth 2 (two) times daily.  Dispense: 180 tablet; Refill: 3  6. Age-related osteoporosis without current pathological fracture -we discussed bone density results have worsened and FRAX score 10 year probability of fracture: major osteoporotic fracture is 13% and hip fracture 2.7%. We discussed these thresholds are below 20% and 3% respectively but did discuss alendronate as a potential treatment. She is not interested in medication at this point and would like to continue Ca+ and weight bearing exercise. Will plan to repeat DEXA in 2 years.    7. Fibrocystic breast disease (FCBD), unspecified laterality - cont yearly mammogram and US as needed  8. Recurrent cold sores - valacyclovir prn  9. History of adenomatous polyp of colon - plan for repeat colonoscopy in 2026  10. Family history of breast cancer in sister - stable  11. History of basal cell carcinoma - stable  12. Gastroesophageal reflux disease without esophagitis - cont famotidine       Other orders  -     Atorvastatin Calcium; Take 1 tablet (80 mg total) by mouth nightly.  Dispense: 90 tablet; Refill: 3    The patient indicates understanding of these issues and agrees to the plan.  Reinforced healthy diet, lifestyle, and exercise.      Return in about 1 year (around 2/12/2025).     Debbie Manley MD, 2/12/2024     Supplementary Documentation:   General Health:  In the past six months, have you lost more than 10 pounds without trying?: 2 - No  Has your appetite been poor?: No  Type of Diet: Balanced  How does the patient  maintain a good energy level?: Appropriate Exercise;Daily Walks;Stretching  How would you describe your daily physical activity?: Moderate  How would you describe your current health state?: Good  How do you maintain positive mental well-being?: Social Interaction;Puzzles;Games;Visiting Friends;Visiting Family  On a scale of 0 to 10, with 0 being no pain and 10 being severe pain, what is your pain level?: 1 - (Mild)  In the past six months, have you experienced urine leakage?: 0-No  At any time do you feel concerned for the safety/well-being of yourself and/or your children, in your home or elsewhere?: No  Have you had any immunizations at another office such as Influenza, Hepatitis B, Tetanus, or Pneumococcal?: No       Nuvia Balbuena's SCREENING SCHEDULE   Tests on this list are recommended by your physician but may not be covered, or covered at this frequency, by your insurer.   Please check with your insurance carrier before scheduling to verify coverage.   PREVENTATIVE SERVICES FREQUENCY &  COVERAGE DETAILS LAST COMPLETION DATE   Diabetes Screening    Fasting Blood Sugar /  Glucose    One screening every 12 months if never tested or if previously tested but not diagnosed with pre-diabetes   One screening every 6 months if diagnosed with pre-diabetes Lab Results   Component Value Date     (H) 04/04/2023        Cardiovascular Disease Screening    Lipid Panel  Cholesterol  Lipoprotein (HDL)  Triglycerides Covered every 5 years for all Medicare beneficiaries without apparent signs or symptoms of cardiovascular disease Lab Results   Component Value Date    CHOLEST 170 04/04/2023    HDL 64 (H) 04/04/2023    LDL 92 04/04/2023    TRIG 74 04/04/2023         Electrocardiogram (EKG)   Covered if needed at Welcome to Medicare, and non-screening if indicated for medical reasons 08/29/2016      Ultrasound Screening for Abdominal Aortic Aneurysm (AAA) Covered once in a lifetime for one of the following risk factors     Men who are 65-75 years old and have ever smoked    Anyone with a family history -     Colorectal Cancer Screening  Covered for ages 50-85; only need ONE of the following:    Colonoscopy   Covered every 10 years    Covered every 2 years if patient is at high risk or previous colonoscopy was abnormal 09/02/2021    Health Maintenance   Topic Date Due    Colorectal Cancer Screening  09/02/2026       Flexible Sigmoidoscopy   Covered every 4 years -    Fecal Occult Blood Test Covered annually -   Bone Density Screening    Bone density screening    Covered every 2 years after age 65 if diagnosed with risk of osteoporosis or estrogen deficiency.    Covered yearly for long-term glucocorticoid medication use (Steroids) No results found for this or any previous visit.      No recommendations at this time   Pap and Pelvic    Pap   Covered every 2 years for women at normal risk; Annually if at high risk 11/10/2021  No recommendations at this time    Chlamydia Annually if high risk -  No recommendations at this time   Screening Mammogram    Mammogram     Recommend annually for all female patients aged 40 and older    One baseline mammogram covered for patients aged 35-39 10/23/2023    Health Maintenance   Topic Date Due    Mammogram  10/23/2024       Immunizations    Influenza Covered once per flu season  Please get every year -  No recommendations at this time    Pneumococcal Each vaccine ([id number] & [id number]) covered once after 65 Prevnar 13: -    [id number]: -     No recommendations at this time    Hepatitis B One screening covered for patients with certain risk factors   -  No recommendations at this time    Tetanus Toxoid Not covered by Medicare Part B unless medically necessary (cut with metal); may be covered with your pharmacy prescription benefits -    Tetanus, Diptheria and Pertusis TD and TDaP Not covered by Medicare Part B -  No recommendations at this time    Zoster Not covered by Medicare Part B; may be  covered with your pharmacy  prescription benefits -  No recommendations at this time

## 2024-02-13 ENCOUNTER — HOSPITAL ENCOUNTER (OUTPATIENT)
Dept: GENERAL RADIOLOGY | Age: 69
Discharge: HOME | End: 2024-02-13
Attending: INTERNAL MEDICINE
Payer: MEDICARE

## 2024-02-13 DIAGNOSIS — G89.29 CHRONIC BILATERAL LOW BACK PAIN WITH LEFT-SIDED SCIATICA: ICD-10-CM

## 2024-02-13 DIAGNOSIS — M54.42 CHRONIC BILATERAL LOW BACK PAIN WITH LEFT-SIDED SCIATICA: ICD-10-CM

## 2024-02-13 PROCEDURE — 72110 X-RAY EXAM L-2 SPINE 4/>VWS: CPT | Performed by: INTERNAL MEDICINE

## 2024-02-14 PROBLEM — C44.91 BASAL CELL CARCINOMA (BCC): Status: RESOLVED | Noted: 2023-03-31 | Resolved: 2024-02-14

## 2024-02-14 PROBLEM — Z85.828 HISTORY OF BASAL CELL CARCINOMA: Status: ACTIVE | Noted: 2024-02-14

## 2024-02-14 PROBLEM — K21.9 GASTROESOPHAGEAL REFLUX DISEASE WITHOUT ESOPHAGITIS: Status: ACTIVE | Noted: 2024-02-14

## 2024-02-19 ENCOUNTER — TELEPHONE (OUTPATIENT)
Dept: PHYSICAL THERAPY | Facility: HOSPITAL | Age: 69
End: 2024-02-19

## 2024-02-20 ENCOUNTER — TELEPHONE (OUTPATIENT)
Facility: LOCATION | Age: 69
End: 2024-02-20

## 2024-02-20 ENCOUNTER — TELEPHONE (OUTPATIENT)
Dept: PHYSICAL THERAPY | Facility: HOSPITAL | Age: 69
End: 2024-02-20

## 2024-02-21 ENCOUNTER — TELEPHONE (OUTPATIENT)
Dept: PHYSICAL THERAPY | Facility: HOSPITAL | Age: 69
End: 2024-02-21

## 2024-02-21 ENCOUNTER — APPOINTMENT (OUTPATIENT)
Facility: LOCATION | Age: 69
End: 2024-02-21
Attending: INTERNAL MEDICINE
Payer: MEDICARE

## 2024-02-26 ENCOUNTER — APPOINTMENT (OUTPATIENT)
Facility: LOCATION | Age: 69
End: 2024-02-26
Attending: INTERNAL MEDICINE
Payer: MEDICARE

## 2024-02-28 ENCOUNTER — APPOINTMENT (OUTPATIENT)
Facility: LOCATION | Age: 69
End: 2024-02-28
Attending: INTERNAL MEDICINE
Payer: MEDICARE

## 2024-03-01 ENCOUNTER — APPOINTMENT (OUTPATIENT)
Facility: LOCATION | Age: 69
End: 2024-03-01
Attending: INTERNAL MEDICINE
Payer: MEDICARE

## 2024-03-05 ENCOUNTER — OFFICE VISIT (OUTPATIENT)
Facility: LOCATION | Age: 69
End: 2024-03-05
Attending: INTERNAL MEDICINE
Payer: MEDICARE

## 2024-03-05 ENCOUNTER — TELEPHONE (OUTPATIENT)
Dept: PHYSICAL THERAPY | Facility: HOSPITAL | Age: 69
End: 2024-03-05

## 2024-03-05 DIAGNOSIS — G89.29 CHRONIC BILATERAL LOW BACK PAIN WITH LEFT-SIDED SCIATICA: Primary | ICD-10-CM

## 2024-03-05 DIAGNOSIS — M54.42 CHRONIC BILATERAL LOW BACK PAIN WITH LEFT-SIDED SCIATICA: Primary | ICD-10-CM

## 2024-03-05 PROCEDURE — 97161 PT EVAL LOW COMPLEX 20 MIN: CPT

## 2024-03-05 PROCEDURE — 97110 THERAPEUTIC EXERCISES: CPT

## 2024-03-05 NOTE — PROGRESS NOTES
SPINE EVALUATION:     Diagnosis:   Chronic bilateral low back pain with left-sided sciatica (M54.42,G89.29)       Referring Provider: Vineet  Date of Evaluation:    3/5/2024    Precautions:  Osteopenia/Osteoporosis Next MD visit:   none scheduled  Date of Surgery: n/a     PATIENT SUMMARY   Nuvia Balbuena is a 68 year old female who presents to therapy today with complaints of low back pain as well as pain in both legs. Describes tingling that can travel down (L) side of leg to the ankle/foot/toes and shooting pain that can travel from (R) buttock down posterior thigh to the knee. This all seemed to happen on December 23rd while doing water aerobics. She was doing leg kicks in different directions and noticed she became tight after her workout. Pain with lifting things at times, cleaning, sitting long periods of time while driving or at home/work, getting into and out of car, holding grandson. She does report that walking can help alleviate symptoms at times and just being mobile. Denies any bladder or bowel issues, denies saddle anesthesia, denies any significant leg weakness. Alleviating activities also rest. 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 Dexa scan showing osteopenia/osteoporosis, imaging showing degenerative changes in spine          Pt describes pain level current 0/10, at best 0/10, at worst 7/10.   Current functional limitations include sitting, standing, steps, getting into and out of car, holding grandchild, cleaning, squatting down.     Nuvia describes prior level of function with no pain or restriction. Pt goals include getting back to water aerobics, performing everyday activities without pain, sitting longer periods of time, getting into and out of car easier, steps.  Past medical history was reviewed with Nuvia. Significant findings include  has a past medical history of Allergic rhinitis, Basal cell carcinoma (BCC), Esophageal reflux, Essential  hypertension, Fibrocystic breast disease (11/06/2014), Heartburn (2015), Hemorrhoids (1990), High cholesterol, History of cardiac murmur (2019), Hyperlipidemia, Indigestion (2015), Pain in joints (2016), and Wears glasses (2000).      ASSESSMENT  Nuvia presents to physical therapy evaluation with primary c/o low back pain and radicular symptoms. Objective measures show decreased and painful thoracolumbar AROM, LE flexibility issues, core and LE strength deficits, impaired gait, hip/lumbar mobility restrictions. Functional limitations include sitting long periods of time, getting in and out of car, household activities, holding grandchild, steps, participating in water aerobics and other modes of fitness. Consistent with diagnosis of lumbar spondylosis and lumbar radiculopathy.     OBJECTIVE:   Observation/Posture: (L) leg appears slightly longer, elevated (L) shoulder & innominate     Gait Summary: pt ambulates on level ground with mild antalgia, appears to have trendelenburg however there is malalignment     Hip Clearing: decreased (B) IR, decreased (R) hip ER     Thoracolumbar AROM: (* denotes performed with pain)  Flexion 75% motion*  Extension <50% motion  (R) SB 75% motion  (L) SB 75% motion    Special Tests: SIJ compression (-), SIJ distraction (-), Posterior Shear (-), Sumeet (-)    Flexibility:   LE (R) (L)   Hip flexor     Hamstring Mod Mod   Piriformis Mod Mod   Quad Mod to max Mod to max   Gastroc-soleus         Strength Summary:  MMT (Scale 0-5)  (R) (L)   Hip Flexion (L2) 4 4+   Knee Extension (L3) 5 5   Ankle DF (L4) 5 5   Great Toe Extension (L5) 5 5   Ankle PF (S1) 4 4   Ankle Eversion (S1)     Hip Extension (S1) 4- 4   Knee Flexion (S2) 5 5   Hip ER 4- 4-   Hip IR 4 4   Hip Abduction 4- 4-   Hip Adduction         Dural Signs: SLR equivocal upon assessment bilaterally    Palpation:   +TTP and associated tightness noted to (R) posterolateral hip musculature (piriformis, gluteal musculature)  +TTP  and tightness noted to (R) QL    Accessory motion:   Decreased lumbar PA glides (mild pain provocation noted to low lumbar spine)    Additional Findings:   Repetitive prone press up did not cause any pain in back or leg symptoms     Today’s Treatment and Response:   Pt education was provided on exam findings, treatment diagnosis, treatment plan, expectations, and prognosis. Pt was also provided recommendations for activity modifications, possible soreness after evaluation, modalities as needed [ice/heat], postural corrections, pain science education , detrimental fear avoidance behaviors, and importance of remaining active. Pt discussion regarding current symptoms, education about lumbar pathology, functional anatomy and biomechanics of the lumbopelvic & hip complex, discussed avoidance of aggravating activities, postural re-education, proper lifting technique, HEP adherence for optimal outcomes     Patient was instructed in and issued a HEP for:     Access Code: [email or web address]  URL: [email or web address]  Date: 03/05/2024  Prepared by: Mynor Guerrero    Exercises  - Supine Posterior Pelvic Tilt  - 2 x daily - 7 x weekly - 2 sets - 10 reps - 5 seconds hold  - Supine Piriformis Stretch with Foot on Ground  - 2 x daily - 7 x weekly - 3 sets - 30-45 seconds hold  - Supine Figure 4 Piriformis Stretch  - 2 x daily - 7 x weekly - 3 sets - 30-45 seconds hold  - Supine Single Knee to Chest Stretch  - 2 x daily - 7 x weekly - 10 reps - 10 seconds hold  - Seated Piriformis Stretch  - 2 x daily - 7 x weekly - 3 sets - 30-45 seconds  hold    Charges: PT Eval Low Complexity, TherEx 1 unit      Total Timed Treatment: 10 min     Total Treatment Time: 45 min     Based on clinical rationale and outcome measures, this evaluation involved Low Complexity decision making due to 1-2 personal factors/comorbidities, 3 body structures involved/activity limitations, and evolving symptoms including changing pain levels.  PLAN OF CARE:     Goals: (to be met in 8 visits)   Pt will improve transversus abdominis recruitment to perform proper isometric contraction without requiring verbal or tactile cuing to promote advancement of therex   Pt will demonstrate good understanding of proper posture and body mechanics to decrease pain and improve spinal safety   Pt will report improved symptom centralization and absence of radicular symptoms for 3 consecutive days to improve function with ADL   Pt will have decreased paraspinal mm tension to tolerate standing >60 minutes for work and home activities   Pt will be able to sit for 30-60 minutes with no more than 3/10 pain  Pt will demonstrate at least 4/5 (B) hip and knee strength to maximize effectiveness and efficiency with functional activities   Pt will report going up and down steps with less reported pain   Pt will be independent and compliant with comprehensive HEP to maintain progress achieved in PT      Frequency / Duration: Patient will be seen for 2 x/week or a total of 8 visits over a 90 day period. Treatment will include: Gait training, Manual Therapy, Neuromuscular Re-education, Self-Care Home Management, Therapeutic Activities, Therapeutic Exercise, Home Exercise Program instruction, and Modalities as necessary     Education or treatment limitation: None    Rehab Potential:good    Oswestry Disability Index Score  Score: 14 % (2/20/2024  6:24 PM)      Patient/Family/Caregiver was advised of these findings, precautions, and treatment options and has agreed to actively participate in planning and for this course of care.    Thank you for your referral. Please co-sign or sign and return this letter via fax as soon as possible to [phone]. If you have any questions, please contact me at Dept: [phone]    Sincerely,  Electronically signed by therapist: Mynor Guerrero, PT    Physician's certification required: Yes  I certify the need for these services furnished under this plan of treatment  and while under my care.    X___________________________________________________ Date____________________    Certification From: 3/5/2024  To:6/3/2024

## 2024-03-07 ENCOUNTER — OFFICE VISIT (OUTPATIENT)
Facility: LOCATION | Age: 69
End: 2024-03-07
Attending: INTERNAL MEDICINE
Payer: MEDICARE

## 2024-03-07 PROCEDURE — 97110 THERAPEUTIC EXERCISES: CPT

## 2024-03-07 PROCEDURE — 97140 MANUAL THERAPY 1/> REGIONS: CPT

## 2024-03-07 NOTE — PROGRESS NOTES
Diagnosis:   Chronic bilateral low back pain with left-sided sciatica (M54.42,G89.29)      Referring Provider: Vineet  Date of Evaluation:    3/5/24    Precautions:    Osteoporosis/osteopenia Next MD visit:     Date of Surgery: n/a   Insurance Primary/Secondary: AETYANIRA MCR / N/A     # Auth Visits: 8            Subjective: has a bit of pain in the back of (R) leg today; walked on treadmill and it was more sore    Pain: 6/10      Objective: see objective       Assessment:   Hypertonicity noted to (R) iliopsoas & QL musculature. Responded well to treatment as she was able to ambulate with less reported pain as well as improvement in leg length and level pelvis  Good tolerance to exercises, HEP updated       Goals:   Pt will improve transversus abdominis recruitment to perform proper isometric contraction without requiring verbal or tactile cuing to promote advancement of therex   Pt will demonstrate good understanding of proper posture and body mechanics to decrease pain and improve spinal safety   Pt will report improved symptom centralization and absence of radicular symptoms for 3 consecutive days to improve function with ADL   Pt will have decreased paraspinal mm tension to tolerate standing >60 minutes for work and home activities   Pt will be able to sit for 30-60 minutes with no more than 3/10 pain  Pt will demonstrate at least 4/5 (B) hip and knee strength to maximize effectiveness and efficiency with functional activities   Pt will report going up and down steps with less reported pain   Pt will be independent and compliant with comprehensive HEP to maintain progress achieved in PT      Plan:   Date: 3/7/2024  TX#: 2/8 Date:                 TX#: 3/ Date:                 TX#: 4/ Date:                 TX#: 5/ Date:   Tx#: 6/   Manual Therapy (30 min)       STM/MFR to (R) QL & iliopsoas        TherEx (10 min)       PPT x 20 (5 second hold)       H/L piriformis stretch (figure 4) 30 sec x 3       Child's pose 30  sec x 3       MET to correct (R) innominate upslip       HEP:   Access Code: [email or web address]  URL: [email or web address]  Date: 03/05/2024  Prepared by: Mynor Guerrero    Exercises  - Supine Posterior Pelvic Tilt  - 2 x daily - 7 x weekly - 2 sets - 10 reps - 5 seconds hold  - Supine Piriformis Stretch with Foot on Ground  - 2 x daily - 7 x weekly - 3 sets - 30-45 seconds hold  - Supine Figure 4 Piriformis Stretch  - 2 x daily - 7 x weekly - 3 sets - 30-45 seconds hold  - Supine Single Knee to Chest Stretch  - 2 x daily - 7 x weekly - 10 reps - 10 seconds hold  - Seated Piriformis Stretch  - 2 x daily - 7 x weekly - 3 sets - 30-45 seconds  hold      Charges: Manual 2; TherEx 1       Total Timed Treatment: 40 min  Total Treatment Time: 40 min

## 2024-03-12 ENCOUNTER — OFFICE VISIT (OUTPATIENT)
Facility: LOCATION | Age: 69
End: 2024-03-12
Attending: INTERNAL MEDICINE
Payer: MEDICARE

## 2024-03-12 PROCEDURE — 97110 THERAPEUTIC EXERCISES: CPT

## 2024-03-12 PROCEDURE — 97140 MANUAL THERAPY 1/> REGIONS: CPT

## 2024-03-12 NOTE — PROGRESS NOTES
Diagnosis:   Chronic bilateral low back pain with left-sided sciatica (M54.42,G89.29)      Referring Provider: Vineet  Date of Evaluation:    3/5/24    Precautions:    Osteoporosis/osteopenia Next MD visit:     Date of Surgery: n/a   Insurance Primary/Secondary: [employer] MCR / N/A     # Auth Visits: 8            Subjective: sitting, and going from sitting to standing causes pain    Pain: 6/10      Objective: see objective       Assessment:   Hypertonicity noted to (R) iliopsoas & QL musculature.   Fair tolerance to soft tissue treatment - less tenderness and tightness  Good tolerance to exercises without increased symptoms     Goals:   Pt will improve transversus abdominis recruitment to perform proper isometric contraction without requiring verbal or tactile cuing to promote advancement of therex   Pt will demonstrate good understanding of proper posture and body mechanics to decrease pain and improve spinal safety   Pt will report improved symptom centralization and absence of radicular symptoms for 3 consecutive days to improve function with ADL   Pt will have decreased paraspinal mm tension to tolerate standing >60 minutes for work and home activities   Pt will be able to sit for 30-60 minutes with no more than 3/10 pain  Pt will demonstrate at least 4/5 (B) hip and knee strength to maximize effectiveness and efficiency with functional activities   Pt will report going up and down steps with less reported pain   Pt will be independent and compliant with comprehensive HEP to maintain progress achieved in PT      Plan:   Date: 3/7/2024  TX#: 2/8 Date: 3/12/24                TX#: 3/8 Date:                 TX#: 4/ Date:                 TX#: 5/ Date:   Tx#: 6/   Manual Therapy (30 min) Manual Therapy (25 min)      STM/MFR to (R) QL & iliopsoas  STM/MFR to (R) hip flexor & QL musculature   Prone lumbosacral distraction, myofascial stretch       TherEx (10 min) TherEx (15 min)      PPT x 20 (5 second hold) PPT x 20 (5  second hold)      H/L piriformis stretch (figure 4) 30 sec x 3 H/L piriformis stretch 30 sec x 3 (R)      Child's pose 30 sec x 3 MET to correct (R) innominate upslip       MET to correct (R) innominate upslip H/L clam RTB x 20       SL clam RTB x 20       HS stretch w/strap demonstration & return demonstration       HEP:   Access Code: [email or web address]  URL: [email or web address]  Date: 03/12/2024  Prepared by: Mynor Guerrero    Exercises  - Supine Posterior Pelvic Tilt  - 2 x daily - 7 x weekly - 2 sets - 10 reps - 5 seconds hold  - Supine Figure 4 Piriformis Stretch  - 2 x daily - 7 x weekly - 3 sets - 30-45 seconds hold  - Clam with Resistance  - 2 x daily - 7 x weekly - 3 sets - 10 reps  - Hooklying Clamshell with Resistance  - 2 x daily - 7 x weekly - 3 sets - 10 reps  - Supine Figure 4 Piriformis Stretch  - 2 x daily - 7 x weekly - 3 sets - 30 seconds  hold  - Child's Pose Stretch  - 2 x daily - 7 x weekly - 3 sets - 30 seconds hold      Charges: Manual 2; TherEx 1       Total Timed Treatment: 40 min  Total Treatment Time: 40 min

## 2024-03-14 ENCOUNTER — OFFICE VISIT (OUTPATIENT)
Facility: LOCATION | Age: 69
End: 2024-03-14
Attending: INTERNAL MEDICINE
Payer: MEDICARE

## 2024-03-14 PROCEDURE — 97140 MANUAL THERAPY 1/> REGIONS: CPT

## 2024-03-14 PROCEDURE — 97110 THERAPEUTIC EXERCISES: CPT

## 2024-03-14 NOTE — PROGRESS NOTES
Diagnosis:   Chronic bilateral low back pain with left-sided sciatica (M54.42,G89.29)      Referring Provider: Vineet  Date of Evaluation:    3/5/24    Precautions:    Osteoporosis/osteopenia Next MD visit:     Date of Surgery: n/a   Insurance Primary/Secondary: [employer]ET[employer] MCR / N/A     # Auth Visits: 8            Subjective: hurts less when walking upstairs, less pain with sitting     Pain: 5/10 with sitting       Objective: see objective       Assessment:   Responded well to soft tissue treament/MFR to the (R) QL, piriformis, hip flexor  Tolerated exercises well with no increased symptoms during session.   Encouraged self piriformis release at home       Goals:   Pt will improve transversus abdominis recruitment to perform proper isometric contraction without requiring verbal or tactile cuing to promote advancement of therex   Pt will demonstrate good understanding of proper posture and body mechanics to decrease pain and improve spinal safety   Pt will report improved symptom centralization and absence of radicular symptoms for 3 consecutive days to improve function with ADL   Pt will have decreased paraspinal mm tension to tolerate standing >60 minutes for work and home activities   Pt will be able to sit for 30-60 minutes with no more than 3/10 pain  Pt will demonstrate at least 4/5 (B) hip and knee strength to maximize effectiveness and efficiency with functional activities   Pt will report going up and down steps with less reported pain   Pt will be independent and compliant with comprehensive HEP to maintain progress achieved in PT      Plan:   Date: 3/7/2024  TX#: 2/8 Date: 3/12/24                TX#: 3/8 Date: 3/14/24                TX#: 4/8 Date:                 TX#: 5/ Date:   Tx#: 6/   Manual Therapy (30 min) Manual Therapy (25 min) Manual Therapy (25 min)     STM/MFR to (R) QL & iliopsoas  STM/MFR to (R) hip flexor & QL musculature   Prone lumbosacral distraction, myofascial stretch  STM/MFR to (R) hip  flexor & QL musculature   STM to (R) piriformis   Prone lumbosacral distraction, myofascial stretch      TherEx (10 min) TherEx (15 min) TherEx (20 min)     PPT x 20 (5 second hold) PPT x 20 (5 second hold) PPT x 20 (5 second hold)     H/L piriformis stretch (figure 4) 30 sec x 3 H/L piriformis stretch 30 sec x 3 (R) H/L piriformis stretch 30 sec x 3 (R)     Child's pose 30 sec x 3 MET to correct (R) innominate upslip  MET to correct (R) innominate upslip      MET to correct (R) innominate upslip H/L clam RTB x 20 H/L clam GTB x 20      SL clam RTB x 20 SL clam GTB x 20      HS stretch w/strap demonstration & return demonstration  HS stretch w/strap 30 sec x 3       Sciatic nerve glides x 15 (B)       Self piriformis release with lacrosse ball against wall (demonstration & return demonstration)     HEP:   Access Code: [email or web address]  URL: [email or web address]  Date: 03/12/2024  Prepared by: Mynor Guerrero    Exercises  - Supine Posterior Pelvic Tilt  - 2 x daily - 7 x weekly - 2 sets - 10 reps - 5 seconds hold  - Supine Figure 4 Piriformis Stretch  - 2 x daily - 7 x weekly - 3 sets - 30-45 seconds hold  - Clam with Resistance  - 2 x daily - 7 x weekly - 3 sets - 10 reps  - Hooklying Clamshell with Resistance  - 2 x daily - 7 x weekly - 3 sets - 10 reps  - Supine Figure 4 Piriformis Stretch  - 2 x daily - 7 x weekly - 3 sets - 30 seconds  hold  - Child's Pose Stretch  - 2 x daily - 7 x weekly - 3 sets - 30 seconds hold      Charges: Manual 2; TherEx 1     Total Timed Treatment: 45 min  Total Treatment Time: 45 min

## 2024-03-18 ENCOUNTER — OFFICE VISIT (OUTPATIENT)
Facility: LOCATION | Age: 69
End: 2024-03-18
Attending: INTERNAL MEDICINE
Payer: MEDICARE

## 2024-03-18 ENCOUNTER — TELEPHONE (OUTPATIENT)
Facility: LOCATION | Age: 69
End: 2024-03-18

## 2024-03-18 PROCEDURE — 97110 THERAPEUTIC EXERCISES: CPT

## 2024-03-18 PROCEDURE — 97140 MANUAL THERAPY 1/> REGIONS: CPT

## 2024-03-18 NOTE — PROGRESS NOTES
Diagnosis:   Chronic bilateral low back pain with left-sided sciatica (M54.42,G89.29)      Referring Provider: Vineet  Date of Evaluation:    3/5/24    Precautions:    Osteoporosis/osteopenia Next MD visit:     Date of Surgery: n/a   Insurance Primary/Secondary: AETYANIRA MCR / N/A     # Auth Visits: 8            Subjective: pain has not been as intense, things have been improving a bit     Pain: 3/10       Objective: see objective       Assessment:   Tolerated treatment well with no significant reports of pain  Easier time rolling over on table with no pain post treatment.   Hypertonicity noted to (R) hip flexor, (R) QL/lumbar paraspinals, & posterolateral hip musculature   Minimal cueing with exercises and performs them every day       Goals:   Pt will improve transversus abdominis recruitment to perform proper isometric contraction without requiring verbal or tactile cuing to promote advancement of therex   Pt will demonstrate good understanding of proper posture and body mechanics to decrease pain and improve spinal safety   Pt will report improved symptom centralization and absence of radicular symptoms for 3 consecutive days to improve function with ADL   Pt will have decreased paraspinal mm tension to tolerate standing >60 minutes for work and home activities   Pt will be able to sit for 30-60 minutes with no more than 3/10 pain  Pt will demonstrate at least 4/5 (B) hip and knee strength to maximize effectiveness and efficiency with functional activities   Pt will report going up and down steps with less reported pain   Pt will be independent and compliant with comprehensive HEP to maintain progress achieved in PT      Plan:   Date: 3/7/2024  TX#: 2/8 Date: 3/12/24                TX#: 3/8 Date: 3/14/24                TX#: 4/8 Date: 3/18/24                TX#: 5/8 Date:   Tx#: 6/   Manual Therapy (30 min) Manual Therapy (25 min) Manual Therapy (25 min) Manual Therapy (25 min)    STM/MFR to (R) QL & iliopsoas   STM/MFR to (R) hip flexor & QL musculature   Prone lumbosacral distraction, myofascial stretch  STM/MFR to (R) hip flexor & QL musculature   STM to (R) piriformis   Prone lumbosacral distraction, myofascial stretch  STM to (R) QL  STM/massage gun (R) piriformis     TherEx (10 min) TherEx (15 min) TherEx (20 min) TherEx (15 min)    PPT x 20 (5 second hold) PPT x 20 (5 second hold) PPT x 20 (5 second hold) H/L piriformis stretch 30 sec x 3    H/L piriformis stretch (figure 4) 30 sec x 3 H/L piriformis stretch 30 sec x 3 (R) H/L piriformis stretch 30 sec x 3 (R) H/L sciatic nerve glide x 10    Child's pose 30 sec x 3 MET to correct (R) innominate upslip  MET to correct (R) innominate upslip  H/L clam GTB x 20    MET to correct (R) innominate upslip H/L clam RTB x 20 H/L clam GTB x 20 H/L TrA BKFO x 10 each     SL clam RTB x 20 SL clam GTB x 20 Seated lumbar flexion w/GPB x 20     HS stretch w/strap demonstration & return demonstration  HS stretch w/strap 30 sec x 3       Sciatic nerve glides x 15 (B)       Self piriformis release with lacrosse ball against wall (demonstration & return demonstration)     HEP:   Access Code: [email or web address]  URL: [email or web address]  Date: 03/12/2024  Prepared by: Mynor Guerrero    Exercises  - Supine Posterior Pelvic Tilt  - 2 x daily - 7 x weekly - 2 sets - 10 reps - 5 seconds hold  - Supine Figure 4 Piriformis Stretch  - 2 x daily - 7 x weekly - 3 sets - 30-45 seconds hold  - Clam with Resistance  - 2 x daily - 7 x weekly - 3 sets - 10 reps  - Hooklying Clamshell with Resistance  - 2 x daily - 7 x weekly - 3 sets - 10 reps  - Supine Figure 4 Piriformis Stretch  - 2 x daily - 7 x weekly - 3 sets - 30 seconds  hold  - Child's Pose Stretch  - 2 x daily - 7 x weekly - 3 sets - 30 seconds hold      Charges: Manual 2; TherEx 1     Total Timed Treatment: 40 min  Total Treatment Time: 40 min

## 2024-03-21 ENCOUNTER — OFFICE VISIT (OUTPATIENT)
Facility: LOCATION | Age: 69
End: 2024-03-21
Attending: INTERNAL MEDICINE
Payer: MEDICARE

## 2024-03-21 PROCEDURE — 97140 MANUAL THERAPY 1/> REGIONS: CPT

## 2024-03-21 PROCEDURE — 97110 THERAPEUTIC EXERCISES: CPT

## 2024-03-21 NOTE — PROGRESS NOTES
Diagnosis:   Chronic bilateral low back pain with left-sided sciatica (M54.42,G89.29)      Referring Provider: Vineet  Date of Evaluation:    3/5/24    Precautions:    Osteoporosis/osteopenia Next MD visit:     Date of Surgery: n/a   Insurance Primary/Secondary: [employer] MCR / N/A     # Auth Visits: 8            Subjective:  walking up stairs better, getting into and out of car with less pain, sitting less pain     Pain: 3/10       Objective: see objective       Assessment:   Gradual improvement in symptoms. Tolerates exercises well with no increase in symptoms   Tightness (R) lumbar paraspinals/QL and posterolateral hip musculature   Less pain with table mobility, getting up from seated position       Goals:   Pt will improve transversus abdominis recruitment to perform proper isometric contraction without requiring verbal or tactile cuing to promote advancement of therex   Pt will demonstrate good understanding of proper posture and body mechanics to decrease pain and improve spinal safety   Pt will report improved symptom centralization and absence of radicular symptoms for 3 consecutive days to improve function with ADL   Pt will have decreased paraspinal mm tension to tolerate standing >60 minutes for work and home activities   Pt will be able to sit for 30-60 minutes with no more than 3/10 pain  Pt will demonstrate at least 4/5 (B) hip and knee strength to maximize effectiveness and efficiency with functional activities   Pt will report going up and down steps with less reported pain   Pt will be independent and compliant with comprehensive HEP to maintain progress achieved in PT      Plan:   Date: 3/7/2024  TX#: 2/8 Date: 3/12/24                TX#: 3/8 Date: 3/14/24                TX#: 4/8 Date: 3/18/24                TX#: 5/8 Date: 3/21/24  Tx#: 6/8   Manual Therapy (30 min) Manual Therapy (25 min) Manual Therapy (25 min) Manual Therapy (25 min) Manual Therapy (15 min)   STM/MFR to (R) QL & iliopsoas  STM/MFR  to (R) hip flexor & QL musculature   Prone lumbosacral distraction, myofascial stretch  STM/MFR to (R) hip flexor & QL musculature   STM to (R) piriformis   Prone lumbosacral distraction, myofascial stretch  STM to (R) QL  STM/massage gun (R) piriformis  Prone lumbosacral distraction   LAD (R) hip, grade 3   TherEx (10 min) TherEx (15 min) TherEx (20 min) TherEx (15 min) TherEx (30 min)   PPT x 20 (5 second hold) PPT x 20 (5 second hold) PPT x 20 (5 second hold) H/L piriformis stretch 30 sec x 3 H/L piriformis stretch 30 sec x 3   H/L piriformis stretch (figure 4) 30 sec x 3 H/L piriformis stretch 30 sec x 3 (R) H/L piriformis stretch 30 sec x 3 (R) H/L sciatic nerve glide x 10 H/L sciatic nerve glide x 15   Child's pose 30 sec x 3 MET to correct (R) innominate upslip  MET to correct (R) innominate upslip  H/L clam GTB x 20 H/L clam GTB x 20   MET to correct (R) innominate upslip H/L clam RTB x 20 H/L clam GTB x 20 H/L TrA BKFO x 10 each SL clam GTB x 20    SL clam RTB x 20 SL clam GTB x 20 Seated lumbar flexion w/GPB x 20 Seated lumbar flexion w/GPB x 20    HS stretch w/strap demonstration & return demonstration  HS stretch w/strap 30 sec x 3  Side step RTB at bar x 4 laps      Sciatic nerve glides x 15 (B)  NuStep x 5 min L6     Self piriformis release with lacrosse ball against wall (demonstration & return demonstration)  PPT x 20 (5 second hold)        Side step x RTB x 3 laps       GPB assisted squat x 20   HEP:   Access Code: [email or web address]  URL: [email or web address]  Date: 03/21/2024  Prepared by: Mynor Guerrero    Exercises  - Supine Posterior Pelvic Tilt  - 2 x daily - 7 x weekly - 2 sets - 10 reps - 5 seconds hold  - Supine Figure 4 Piriformis Stretch  - 2 x daily - 7 x weekly - 3 sets - 30-45 seconds hold  - Clam with Resistance  - 2 x daily - 7 x weekly - 3 sets - 10 reps  - Hooklying Clamshell with Resistance  - 2 x daily - 7 x weekly - 3 sets - 10 reps  - Child's Pose Stretch  - 2 x daily - 7 x  weekly - 3 sets - 30 seconds hold  - Supine Hamstring Stretch with Strap  - 2 x daily - 7 x weekly - 3 sets - 30 seconds hold  - Supine Sciatic Nerve Glide  - 2 x daily - 7 x weekly - 2 sets - 10 reps  - Supine Hamstring Stretch  - 2 x daily - 7 x weekly - 3 sets - 30 seconds hold  - Supine Piriformis Stretch with Foot on Ground  - 2 x daily - 7 x weekly - 3 sets - 30 seconds hold      Charges: Manual 1; TherEx 2     Total Timed Treatment: 45 min  Total Treatment Time: 45 min

## 2024-03-26 ENCOUNTER — OFFICE VISIT (OUTPATIENT)
Facility: LOCATION | Age: 69
End: 2024-03-26
Attending: INTERNAL MEDICINE
Payer: MEDICARE

## 2024-03-26 PROCEDURE — 97110 THERAPEUTIC EXERCISES: CPT

## 2024-03-26 PROCEDURE — 97140 MANUAL THERAPY 1/> REGIONS: CPT

## 2024-03-26 NOTE — PROGRESS NOTES
Diagnosis:   Chronic bilateral low back pain with left-sided sciatica (M54.42,G89.29)      Referring Provider: Vineet  Date of Evaluation:    3/5/24    Precautions:    Osteoporosis/osteopenia Next MD visit:     Date of Surgery: n/a   Insurance Primary/Secondary: [employer] MCR / N/A     # Auth Visits: 8            Subjective:  did a lot of cleaning yesterday and did not feel too bad; still having pain going up stairs; 50-60     Pain: 3/10       Objective: see objective       Assessment:   Tolerated treatment session well. Carryover from manual and exercise intervention has been fair.   Notices some improvements throughout the day but pain still persists       Goals:   Pt will improve transversus abdominis recruitment to perform proper isometric contraction without requiring verbal or tactile cuing to promote advancement of therex   Pt will demonstrate good understanding of proper posture and body mechanics to decrease pain and improve spinal safety   Pt will report improved symptom centralization and absence of radicular symptoms for 3 consecutive days to improve function with ADL   Pt will have decreased paraspinal mm tension to tolerate standing >60 minutes for work and home activities   Pt will be able to sit for 30-60 minutes with no more than 3/10 pain  Pt will demonstrate at least 4/5 (B) hip and knee strength to maximize effectiveness and efficiency with functional activities   Pt will report going up and down steps with less reported pain   Pt will be independent and compliant with comprehensive HEP to maintain progress achieved in PT      Plan:   Date: 3/7/2024  TX#: 2/8 Date: 3/12/24                TX#: 3/8 Date: 3/14/24                TX#: 4/8 Date: 3/18/24                TX#: 5/8 Date: 3/21/24  Tx#: 6/8 Date: 3/26/24   Manual Therapy (30 min) Manual Therapy (25 min) Manual Therapy (25 min) Manual Therapy (25 min) Manual Therapy (15 min) Manual Therapy (10 min)   STM/MFR to (R) QL & iliopsoas  STM/MFR to (R)  hip flexor & QL musculature   Prone lumbosacral distraction, myofascial stretch  STM/MFR to (R) hip flexor & QL musculature   STM to (R) piriformis   Prone lumbosacral distraction, myofascial stretch  STM to (R) QL  STM/massage gun (R) piriformis  Prone lumbosacral distraction   LAD (R) hip, grade 3 Prone lumbosacral distraction   LAD (R) hip, grade 3   TherEx (10 min) TherEx (15 min) TherEx (20 min) TherEx (15 min) TherEx (30 min) TherEx (30 min)   PPT x 20 (5 second hold) PPT x 20 (5 second hold) PPT x 20 (5 second hold) H/L piriformis stretch 30 sec x 3 H/L piriformis stretch 30 sec x 3 H/L piriformis stretch 30 sec x 3   H/L piriformis stretch (figure 4) 30 sec x 3 H/L piriformis stretch 30 sec x 3 (R) H/L piriformis stretch 30 sec x 3 (R) H/L sciatic nerve glide x 10 H/L sciatic nerve glide x 15 H/L sciatic nerve glide x 15  H/L HS stretch 15 seconds x 5   Child's pose 30 sec x 3 MET to correct (R) innominate upslip  MET to correct (R) innominate upslip  H/L clam GTB x 20 H/L clam GTB x 20 H/L TrA (L2) march x 15   MET to correct (R) innominate upslip H/L clam RTB x 20 H/L clam GTB x 20 H/L TrA BKFO x 10 each SL clam GTB x 20 H/L TrA BKFO GTB x 10 each    SL clam RTB x 20 SL clam GTB x 20 Seated lumbar flexion w/GPB x 20 Seated lumbar flexion w/GPB x 20 SL clam GTB x 20    HS stretch w/strap demonstration & return demonstration  HS stretch w/strap 30 sec x 3  Side step RTB at bar x 4 laps  SL thoracic open book x 10 (left side lying)     Sciatic nerve glides x 15 (B)  NuStep x 5 min L6      Self piriformis release with lacrosse ball against wall (demonstration & return demonstration)  PPT x 20 (5 second hold)         Side step x RTB x 3 laps        GPB assisted squat x 20    HEP:   Access Code: [email or web address]  URL: [email or web address]  Date: 03/21/2024  Prepared by: Mynor Guerrero    Exercises  - Supine Posterior Pelvic Tilt  - 2 x daily - 7 x weekly - 2 sets - 10 reps - 5 seconds hold  - Supine Figure 4  Piriformis Stretch  - 2 x daily - 7 x weekly - 3 sets - 30-45 seconds hold  - Clam with Resistance  - 2 x daily - 7 x weekly - 3 sets - 10 reps  - Hooklying Clamshell with Resistance  - 2 x daily - 7 x weekly - 3 sets - 10 reps  - Child's Pose Stretch  - 2 x daily - 7 x weekly - 3 sets - 30 seconds hold  - Supine Hamstring Stretch with Strap  - 2 x daily - 7 x weekly - 3 sets - 30 seconds hold  - Supine Sciatic Nerve Glide  - 2 x daily - 7 x weekly - 2 sets - 10 reps  - Supine Hamstring Stretch  - 2 x daily - 7 x weekly - 3 sets - 30 seconds hold  - Supine Piriformis Stretch with Foot on Ground  - 2 x daily - 7 x weekly - 3 sets - 30 seconds hold      Charges: Manual 1; TherEx 2     Total Timed Treatment: 40 min  Total Treatment Time: 40 min

## 2024-03-29 ENCOUNTER — OFFICE VISIT (OUTPATIENT)
Facility: LOCATION | Age: 69
End: 2024-03-29
Attending: INTERNAL MEDICINE
Payer: MEDICARE

## 2024-03-29 PROCEDURE — 97110 THERAPEUTIC EXERCISES: CPT

## 2024-03-29 PROCEDURE — 97140 MANUAL THERAPY 1/> REGIONS: CPT

## 2024-03-29 NOTE — PROGRESS NOTES
Diagnosis:   Chronic bilateral low back pain with left-sided sciatica (M54.42,G89.29)      Referring Provider: Vineet  Date of Evaluation:    3/5/24    Precautions:    Osteoporosis/osteopenia Next MD visit:     Date of Surgery: n/a   Insurance Primary/Secondary: AET[employer] MCR / N/A     # Auth Visits: 8           Discharge Summary  Pt has attended 8 visits in Physical Therapy    .    Subjective:  50-60% improvement overall. Continues to have pain with reported functional limitations but less intense at times    Pain: 3/10       Objective:     Thoracolumbar AROM: (* denotes performed with pain)  (Evaluation): Flexion 75% motion*, Extension <50% motion, (R) SB 75% motion, (L) SB 75% motion  (Discharge): Flexion 75% motion, Extension 60% motion, (R) SB 75% motion, (L) SB 75% motion    Flexibility:   (Evaluation)  LE (R) (L)   Hip flexor     Hamstring Mod Mod   Piriformis Mod Mod   Quad Mod to max Mod to max   Gastroc-soleus       (Discharge): mod guarding (L) hip flexor, mod tightness quads, mild to mod tightness HS    Strength Summary:  (Evaluation):   MMT (Scale 0-5)  (R) (L)   Hip Flexion (L2) 4 4+   Knee Extension (L3) 5 5   Ankle DF (L4) 5 5   Great Toe Extension (L5) 5 5   Ankle PF (S1) 4 4   Ankle Eversion (S1)     Hip Extension (S1) 4- 4   Knee Flexion (S2) 5 5   Hip ER 4- 4-   Hip IR 4 4   Hip Abduction 4- 4-   Hip Adduction       (Discharge): (R) hip extension, ER, abduction 4-/5    Palpation:   (Evaluation): +TTP and associated tightness noted to (R) posterolateral hip musculature (piriformis, gluteal musculature), +TTP and tightness noted to (R) QL  (Discharge): less tenderness throughout musculature above to palpation however still increased tone        Assessment:   Tolerates treatment sessions well with no significant increases in pain. Manual & exercise intervention have improved symptoms overall (reported 50-60% better). She can sit a bit longer with less pain and at times less pain when getting up from  seated position. She still continues to struggle with these however. Put forth good effort with therapy sessions and worked on achieving goals set out. Pt will follow up with physician if necessary while performing HEP      Goals:   Pt will improve transversus abdominis recruitment to perform proper isometric contraction without requiring verbal or tactile cuing to promote advancement of therex - met  Pt will demonstrate good understanding of proper posture and body mechanics to decrease pain and improve spinal safety - met  Pt will report improved symptom centralization and absence of radicular symptoms for 3 consecutive days to improve function with ADL - not met  Pt will have decreased paraspinal mm tension to tolerate standing >60 minutes for work and home activities - met  Pt will be able to sit for 30-60 minutes with no more than 3/10 pain - not met  Pt will demonstrate at least 4/5 (B) hip and knee strength to maximize effectiveness and efficiency with functional activities - not met  Pt will report going up and down steps with less reported pain - partially met  Pt will be independent and compliant with comprehensive HEP to maintain progress achieved in PT - met      Plan:   Oswestry Disability Index Score  Score: 14 % (2/20/2024  6:24 PM)    Post Oswestry Disability Index Score  Post Score: 10 % (3/29/2024  7:57 AM)    4 % improvement        Date: 3/7/2024  TX#: 2/8 Date: 3/12/24                TX#: 3/8 Date: 3/14/24                TX#: 4/8 Date: 3/18/24                TX#: 5/8 Date: 3/21/24  Tx#: 6/8 Date: 3/26/24  Tx#: 7/8 Date: 3/29/24  Tx#: 8/10   Manual Therapy (30 min) Manual Therapy (25 min) Manual Therapy (25 min) Manual Therapy (25 min) Manual Therapy (15 min) Manual Therapy (10 min) Manual Therapy (10 min)   STM/MFR to (R) QL & iliopsoas  STM/MFR to (R) hip flexor & QL musculature   Prone lumbosacral distraction, myofascial stretch  STM/MFR to (R) hip flexor & QL musculature   STM to (R)  piriformis   Prone lumbosacral distraction, myofascial stretch  STM to (R) QL  STM/massage gun (R) piriformis  Prone lumbosacral distraction   LAD (R) hip, grade 3 Prone lumbosacral distraction   LAD (R) hip, grade 3 Prone lumbosacral myofascial release and gentle distraction  Massage gun to (R) piriformis    TherEx (10 min) TherEx (15 min) TherEx (20 min) TherEx (15 min) TherEx (30 min) TherEx (30 min) TherEx (30 min)   PPT x 20 (5 second hold) PPT x 20 (5 second hold) PPT x 20 (5 second hold) H/L piriformis stretch 30 sec x 3 H/L piriformis stretch 30 sec x 3 H/L piriformis stretch 30 sec x 3 H/L piriformis stretch 30 sec x 3   H/L piriformis stretch (figure 4) 30 sec x 3 H/L piriformis stretch 30 sec x 3 (R) H/L piriformis stretch 30 sec x 3 (R) H/L sciatic nerve glide x 10 H/L sciatic nerve glide x 15 H/L sciatic nerve glide x 15  H/L HS stretch 15 seconds x 5 H/L sciatic nerve glide x 15  H/L HS stretch 15 seconds x 5   Child's pose 30 sec x 3 MET to correct (R) innominate upslip  MET to correct (R) innominate upslip  H/L clam GTB x 20 H/L clam GTB x 20 H/L TrA (L2) march x 15 H/L TrA (L2) march x 15   MET to correct (R) innominate upslip H/L clam RTB x 20 H/L clam GTB x 20 H/L TrA BKFO x 10 each SL clam GTB x 20 H/L TrA BKFO GTB x 10 each H/L TrA BKFO GTB x 10 each    SL clam RTB x 20 SL clam GTB x 20 Seated lumbar flexion w/GPB x 20 Seated lumbar flexion w/GPB x 20 SL clam GTB x 20 SL clam GTB x 20    HS stretch w/strap demonstration & return demonstration  HS stretch w/strap 30 sec x 3  Side step RTB at bar x 4 laps  SL thoracic open book x 10 (left side lying) Side step RTB x 2 laps     Sciatic nerve glides x 15 (B)  NuStep x 5 min L6  TrA resistance band pulldowns x 20     Self piriformis release with lacrosse ball against wall (demonstration & return demonstration)  PPT x 20 (5 second hold)   HEP review, updated       Side step x RTB x 3 laps         GPB assisted squat x 20     HEP:   Access Code:  [email or web address]  URL: [email or web address]  Date: 03/29/2024  Prepared by: Mynor Guerrero    Exercises  - Supine Posterior Pelvic Tilt  - 2 x daily - 7 x weekly - 2 sets - 10 reps - 5 seconds hold  - Supine Figure 4 Piriformis Stretch  - 2 x daily - 7 x weekly - 3 sets - 30-45 seconds hold  - Clam with Resistance  - 2 x daily - 7 x weekly - 3 sets - 10 reps  - Hooklying Clamshell with Resistance  - 2 x daily - 7 x weekly - 3 sets - 10 reps  - Child's Pose Stretch  - 2 x daily - 7 x weekly - 3 sets - 30 seconds hold  - Supine Hamstring Stretch with Strap  - 2 x daily - 7 x weekly - 3 sets - 30 seconds hold  - Supine Sciatic Nerve Glide  - 2 x daily - 7 x weekly - 2 sets - 10 reps  - Supine Hamstring Stretch  - 2 x daily - 7 x weekly - 3 sets - 30 seconds hold  - Supine Piriformis Stretch with Foot on Ground  - 2 x daily - 7 x weekly - 3 sets - 30 seconds hold  - Side Stepping with Resistance at Ankles  - 1 x daily - 7 x weekly - 3 sets - 10 reps  - Wall Squat with Swiss Ball  - 1 x daily - 7 x weekly - 2 sets - 10 reps      Charges: Manual 1: TherEx 2   Total Timed Treatment: 40 min  Total Treatment Time: 40 min

## 2024-04-18 ENCOUNTER — APPOINTMENT (OUTPATIENT)
Facility: LOCATION | Age: 69
End: 2024-04-18
Attending: INTERNAL MEDICINE
Payer: MEDICARE

## 2024-04-23 ENCOUNTER — APPOINTMENT (OUTPATIENT)
Facility: LOCATION | Age: 69
End: 2024-04-23
Attending: INTERNAL MEDICINE
Payer: MEDICARE

## 2024-04-25 ENCOUNTER — APPOINTMENT (OUTPATIENT)
Facility: LOCATION | Age: 69
End: 2024-04-25
Attending: INTERNAL MEDICINE
Payer: MEDICARE

## 2024-11-04 ENCOUNTER — PATIENT MESSAGE (OUTPATIENT)
Dept: INTERNAL MEDICINE CLINIC | Facility: CLINIC | Age: 69
End: 2024-11-04

## 2025-01-23 ENCOUNTER — PATIENT MESSAGE (OUTPATIENT)
Dept: INTERNAL MEDICINE CLINIC | Facility: CLINIC | Age: 70
End: 2025-01-23

## 2025-01-23 DIAGNOSIS — M54.42 CHRONIC BILATERAL LOW BACK PAIN WITH LEFT-SIDED SCIATICA: Primary | ICD-10-CM

## 2025-01-23 DIAGNOSIS — G89.29 CHRONIC BILATERAL LOW BACK PAIN WITH LEFT-SIDED SCIATICA: Primary | ICD-10-CM

## 2025-02-13 ENCOUNTER — TELEPHONE (OUTPATIENT)
Dept: PHYSICAL THERAPY | Facility: HOSPITAL | Age: 70
End: 2025-02-13

## 2025-02-19 ENCOUNTER — OFFICE VISIT (OUTPATIENT)
Dept: PHYSICAL THERAPY | Age: 70
End: 2025-02-19
Attending: INTERNAL MEDICINE
Payer: MEDICARE

## 2025-02-19 DIAGNOSIS — G89.29 CHRONIC BILATERAL LOW BACK PAIN WITH LEFT-SIDED SCIATICA: Primary | ICD-10-CM

## 2025-02-19 DIAGNOSIS — M54.42 CHRONIC BILATERAL LOW BACK PAIN WITH LEFT-SIDED SCIATICA: Primary | ICD-10-CM

## 2025-02-19 PROCEDURE — 97161 PT EVAL LOW COMPLEX 20 MIN: CPT

## 2025-02-19 PROCEDURE — 97110 THERAPEUTIC EXERCISES: CPT

## 2025-02-19 NOTE — PROGRESS NOTES
SPINE EVALUATION:     Diagnosis:   Left SIded Gulteal Tendinitis Patient:  Nuvia Balbuena (69 year old, female)        Referring Provider: Debbie Manley  Today's Date   2/19/2025    Precautions:  Other (use comment)   Date of Evaluation: 02/19/25  Next MD visit: No data recorded  Date of Surgery: No data recorded     PATIENT SUMMARY   Summary of chief complaints: left sided gluteal pain, causing paraesthesia along the lateral left leg into the foot.  States it has been going on for ~1 year.  States it has worsened over the past 2 months. States it initiated with an active hip extension exercise routine.  History of current condition:     Pain level: current 5 /10, at best 3 /10, at worst 0 /10  Description of symptoms: posterior lateral left leg that is increased in the am, increased with sustained stance and ambulation and bending forward.  States she has had all of ther exercises on hold for the past month.   Occupation: Retired teacher   Leisure activities/Hobbies:     Prior level of function: unlimited  Current limitations: difficulties with sustained stance, exercise, and ambulation  Pt goals: to have a resolution of her symptoms.  Past medical history was reviewed with Nuvia.  Significant findings include:    Imaging/Tests:     Nuvia  has a past medical history of Allergic rhinitis, Basal cell carcinoma (BCC), Esophageal reflux, Essential hypertension, Fibrocystic breast disease (11/06/2014), Heartburn (2015), Hemorrhoids (1990), High cholesterol, History of cardiac murmur (2019), Hyperlipidemia, Indigestion (2015), Pain in joints (2016), and Wears glasses (2000).  She  has a past surgical history that includes Breast biopsy (08/2011); colonoscopy (08/2015); and skin surgery (10/2022).    ASSESSMENT  Nuvia presents to physical therapy evaluation with primary c/o left sided gluteal pain, causing paraesthesia along the lateral left leg into the foot.  States it has been going on for ~1 year.  States  it has worsened over the past 2 months. States it initiated with an active hip extension exercise routine.. The results of the objective tests and measures show Increased pain with contraction and lengthening of left gluteal region.  Pain to palpation with muscle wasting at the left gluteal.. Functional deficits include but are not limited to difficulties with sustained stance, exercise, and ambulation. Signs and symptoms are consistent with diagnosis of Left SIded Gulteal Tendinitis. Pt and PT discussed evaluation findings, pathology, POC and HEP.  Pt voiced understanding and performs HEP correctly without reported pain. Skilled Physical Therapy is medically necessary to address the above impairments and reach functional goals.    OBJECTIVE:   Musculoskeletal:  Observation/Posture: Mild increase in lumbar lordosis with anterior pelvic tilt.   Accessory Motion: Moderate decrease   Palpation: NA     Special tests:   Negative slump, SLR, Myotomal and dermatome assessment     SAIRA ROM WNL and Strength (5/5) except below:  (* denotes performed with pain)  Trunk ROM MMT (-/5)     Flex 75% full motion       Ext 75% full motion      R L R L     Side bend 75% full motion 75% full motion         Rotation 75% full motion with left hip pain provocation 75% full motion with left hip pain provocation           Flexibility:  LE Flexibility R L     Hip Flexor         Hamstrings Minimal Restriction Minimal Restriction     ITB         Piriformis         Quads Minimal Restriction Minimal Restriction     Gastroc-soleus Minimal Restriction Minimal Restriction        Neurological:  Sensation: Grossly intact to light touch SAIRA UE/LE except WNL   Deep Tendon Reflexes: WNL except WNL   UMN: signs and symptoms WNL except NA   Peripheral Neurodynamic: WNL except NA     Balance and Functional Mobility:  Gait: pt ambulates on level ground with normal mechanics.  Balance: SLS: EO R  , EO L           Today's Treatment and Response:   Pt education  was provided on exam findings, treatment diagnosis, treatment plan, expectations, and prognosis.  Today's Treatment       2/19/2025   PT Treatment   Therapeutic Exercise Low level hip strengthening   Therapeutic Exercise Min 10   Evaluation Min 25   Total of Timed Procedures 10   Total of Service Based 25   Total Treatment Time 35         Patient was instructed in and issued a HEP for:      Charges:  PT EVAL: Low Complexity, TherEx 1; Eval 1  In agreement with evaluation findings and clinical rationale, this evaluation involved LOW COMPLEXITY decision making due to no personal factors/comorbidities, 1-2 body structures involved/activity limitations, and stable symptoms as documented in the evaluation.                                                                         PLAN OF CARE:    Goals: (to be met in 12 visits)   Goals       Therapy Goals     1. Improve upon CHRYSTAL assessment > 11% from INE to DC.  2. Patient will be aware of postural limitations and be able to correct them independently.    3. Patient will have an increase in spinal mobility to >75% without symptom provocation in order to return to improved tolerance for sitting, sustained ambulation, and exercise.    4. Patient will have an increase in core strength to assist with returning to sustained posturing.   5. Patient will demonstrate an increase in MLT of the iliopsoas and hamstrings in order to return to more normalized ambulation and exercise.                 Frequency / Duration: Patient will be seen 2x/week or a total of 12  visits over a 90 day period. Treatment will include: Gait training; Manual Therapy; Neuromuscular Re-education; Therapeutic Activities; Therapeutic Exercise    Education or treatment limitation: None   Rehab Potential: good     Oswestry Disability Index Score  Score: 14 % (2/20/2024  6:24 PM)      Patient/Family/Caregiver was advised of these findings, precautions, and treatment options and has agreed to actively  participate in planning and for this course of care.    Thank you for your referral. Please co-sign or sign and return this letter via fax as soon as possible to [phone]. If you have any questions, please contact me at Dept: [phone]    Sincerely,  Electronically signed by therapist: Spenser Chin PT  Physician's certification required: Yes  I certify the need for these services furnished under this plan of treatment and while under my care.    X___________________________________________________ Date____________________    Certification From: 2/19/2025  To:5/20/2025

## 2025-02-24 ENCOUNTER — OFFICE VISIT (OUTPATIENT)
Dept: PHYSICAL THERAPY | Age: 70
End: 2025-02-24
Attending: INTERNAL MEDICINE
Payer: MEDICARE

## 2025-02-24 PROCEDURE — 97110 THERAPEUTIC EXERCISES: CPT

## 2025-02-24 PROCEDURE — 97140 MANUAL THERAPY 1/> REGIONS: CPT

## 2025-02-24 NOTE — PROGRESS NOTES
Patient: Nuvia Balbuena (69 year old, female) Referring Provider:  Insurance:   Diagnosis: Left SIded Gulteal Tendinitis Debbie Vineet FUNEZ   Date of Surgery: No data recorded Next MD visit:  N/A   Precautions:  Other (use comment) No data recorded Referral Information:    Date of Evaluation: Req: 0, Auth: 0, Exp:     02/19/25 POC Auth Visits:          Today's Date   2/24/2025    Subjective  States that there is pins and needles into the left anterior leg.  Pain is 0/10 with rest and 6/10 with stance and walking.  States that she was very active this weekend including [place].       Pain:       Objective  Treatment per log below           Assessment  Responded well with improved tolerance for exercises.    Goals (to be met in 12 visits)   Goals       Therapy Goals     1. Improve upon CHRYSTAL assessment > 11% from INE to DC.  2. Patient will be aware of postural limitations and be able to correct them independently.    3. Patient will have an increase in spinal mobility to >75% without symptom provocation in order to return to improved tolerance for sitting, sustained ambulation, and exercise.    4. Patient will have an increase in core strength to assist with returning to sustained posturing.   5. Patient will demonstrate an increase in MLT of the iliopsoas and hamstrings in order to return to more normalized ambulation and exercise.                Plan  Assess response and progress as tolerated.    Treatment Last 4 Visits       2/19/2025 2/24/2025   PT Treatment   Treatment Day  2   Therapeutic Exercise Low level hip strengthening Elliptical L2 x 4 min   KTOS 30 sec x 3  Open book stretch 10 sec x 10  SL Clamshell red x 20  Kneeling Hip Hiking x 20  TRX Lat Stretch 10 reps   Cable Column Bilateral Extension 48# x 20  Cable Column Unilateral Rows 36# x20     Manual Therapy  Hip IR and FLXN Manipulation x 10 min   Therapeutic Exercise Min 10 25   Manual Therapy Min  15   Evaluation Min 25    Total of  Timed Procedures 10 40   Total of Service Based 25 0   Total Treatment Time 35 40         HEP       Charges     TherEx 2; MAnual PT 1

## 2025-02-28 ENCOUNTER — OFFICE VISIT (OUTPATIENT)
Dept: PHYSICAL THERAPY | Age: 70
End: 2025-02-28
Attending: INTERNAL MEDICINE
Payer: MEDICARE

## 2025-02-28 PROCEDURE — 97110 THERAPEUTIC EXERCISES: CPT

## 2025-02-28 PROCEDURE — 97140 MANUAL THERAPY 1/> REGIONS: CPT

## 2025-02-28 NOTE — PROGRESS NOTES
Patient: Nuvia Balbuena (69 year old, female) Referring Provider:  Insurance:   Diagnosis: Left SIded Gulteal Tendinitis Debbie Vineet FUNEZ   Date of Surgery: No data recorded Next MD visit:  N/A   Precautions:  Other (use comment) No data recorded Referral Information:    Date of Evaluation: Req: 0, Auth: 0, Exp:     02/19/25 POC Auth Visits:          Today's Date   2/28/2025    Subjective  Feeling good overall.  Having some achiness into the calf with her walking.       Pain:       Objective  Treatment per log below. Lumbar Right rotation increased symptoms into left hip.  Nothing distal to the hip.           Assessment  Responded well with improved tolerance for exercises.    Goals (to be met in 12 visits)   Goals       Therapy Goals     1. Improve upon CHRYSTAL assessment > 11% from INE to DC.  2. Patient will be aware of postural limitations and be able to correct them independently.    3. Patient will have an increase in spinal mobility to >75% without symptom provocation in order to return to improved tolerance for sitting, sustained ambulation, and exercise.    4. Patient will have an increase in core strength to assist with returning to sustained posturing.   5. Patient will demonstrate an increase in MLT of the iliopsoas and hamstrings in order to return to more normalized ambulation and exercise.                Plan  Assess response and progress as tolerated.    Treatment Last 4 Visits       2/19/2025 2/24/2025 2/28/2025   PT Treatment   Treatment Day  2 3   Therapeutic Exercise Low level hip strengthening Elliptical L2 x 4 min   KTOS 30 sec x 3  Open book stretch 10 sec x 10  SL Clamshell red x 20  Kneeling Hip Hiking x 20  TRX Lat Stretch 10 reps   Cable Column Bilateral Extension 48# x 20  Cable Column Unilateral Rows 36# x20   Elliptical L2 x 4 min   KTOS 30 sec x 3  Open book stretch 10 sec x 10  SL Clamshell green x 20  Kneeling Hip Hiking x 20  TRX Lat Stretch 10 reps   Cable Column Bilateral  Extension 48# x 20  Cable Column Unilateral Rows 36# x20  Prostretch 30 sec x 3     Neuro Re-Ed   Rhythmic stabilization x 10 reps for TrA recruitment.     Manual Therapy  Hip IR and FLXN Manipulation x 10 min Lumbar SB manipulation x 10 min   Therapeutic Exercise Min 10 25 25   Manual Therapy Min  15 15   Evaluation Min 25     Total of Timed Procedures 10 40 40   Total of Service Based 25 0 0   Total Treatment Time 35 40 40         HEP       Charges     2 TherEx; Manual PT 1

## 2025-03-03 ENCOUNTER — OFFICE VISIT (OUTPATIENT)
Dept: PHYSICAL THERAPY | Age: 70
End: 2025-03-03
Attending: INTERNAL MEDICINE
Payer: MEDICARE

## 2025-03-03 PROCEDURE — 97140 MANUAL THERAPY 1/> REGIONS: CPT

## 2025-03-03 PROCEDURE — 97110 THERAPEUTIC EXERCISES: CPT

## 2025-03-03 NOTE — PROGRESS NOTES
Patient: Nuvia Balbuena (69 year old, female) Referring Provider:  Insurance:   Diagnosis: Left SIded Gulteal Tendinitis Debbie Vineet FUNEZ   Date of Surgery: No data recorded Next MD visit:  N/A   Precautions:  Other (use comment) No data recorded Referral Information:    Date of Evaluation: Req: 0, Auth: 0, Exp:     02/19/25 POC Auth Visits:          Today's Date   3/3/2025    Subjective  States that she is improving overall, but continues to get some tingling in the left lateral calf when first standing. Being up for ~ 1 hour helps improve the symptoms.       Pain: 6/10     Objective  Treatment per log below.           Assessment  Responded well with improved tolerance for exercises.    Goals (to be met in 12 visits)   Goals       Therapy Goals     1. Improve upon CHRYSTAL assessment > 11% from INE to DC.  2. Patient will be aware of postural limitations and be able to correct them independently.    3. Patient will have an increase in spinal mobility to >75% without symptom provocation in order to return to improved tolerance for sitting, sustained ambulation, and exercise.    4. Patient will have an increase in core strength to assist with returning to sustained posturing.   5. Patient will demonstrate an increase in MLT of the iliopsoas and hamstrings in order to return to more normalized ambulation and exercise.                Plan  Assess response and progress as tolerated.    Treatment Last 4 Visits       2/19/2025 2/24/2025 2/28/2025 3/3/2025   PT Treatment   Treatment Day  2 3 4   Therapeutic Exercise Low level hip strengthening Elliptical L2 x 4 min   KTOS 30 sec x 3  Open book stretch 10 sec x 10  SL Clamshell red x 20  Kneeling Hip Hiking x 20  TRX Lat Stretch 10 reps   Cable Column Bilateral Extension 48# x 20  Cable Column Unilateral Rows 36# x20   Elliptical L2 x 4 min   KTOS 30 sec x 3  Open book stretch 10 sec x 10  SL Clamshell green x 20  Kneeling Hip Hiking x 20  TRX Lat Stretch 10 reps   Cable  Column Bilateral Extension 48# x 20  Cable Column Unilateral Rows 36# x20  Prostretch 30 sec x 3   Elliptical L2 x 4 min   Sciatic nerve glide x 10  Open book stretch 10 sec x 10  Seated flexion stretch and bilateral SB x 10  TrA Tabletop Marches x 20; BKFO x 20  TRX Lat Stretch 10 reps   Closed chain hamstring stretch 10 sec x 10  Prostretch 30 sec x 3   Neuro Re-Ed   Rhythmic stabilization x 10 reps for TrA recruitment.      Manual Therapy  Hip IR and FLXN Manipulation x 10 min Lumbar SB manipulation x 10 min Lumbar SB manipulation left side x 10   Therapeutic Exercise Min 10 25 25 25   Manual Therapy Min  15 15 15   Evaluation Min 25      Total of Timed Procedures 10 40 40 40   Total of Service Based 25 0 0 0   Total Treatment Time 35 40 40 40         HEP       Charges     TherEx 2; Manual PT 1

## 2025-03-05 ENCOUNTER — OFFICE VISIT (OUTPATIENT)
Dept: PHYSICAL THERAPY | Age: 70
End: 2025-03-05
Attending: INTERNAL MEDICINE
Payer: MEDICARE

## 2025-03-05 PROCEDURE — 97110 THERAPEUTIC EXERCISES: CPT

## 2025-03-05 NOTE — PROGRESS NOTES
Patient: Nuvia Balbuena (69 year old, female) Referring Provider:  Insurance:   Diagnosis: Left SIded Gulteal Tendinitis Debbie Vineet FUNEZ   Date of Surgery: No data recorded Next MD visit:  N/A   Precautions:  Other (use comment) No data recorded Referral Information:    Date of Evaluation: Req: 0, Auth: 0, Exp:     02/19/25 POC Auth Visits:          Today's Date   3/5/2025    Subjective  States that the tingling in the lateral left leg is still consistent.  Pain is down. Did her pool program for the last 2 days without symptoms, where she would get symptoms earlier this year when in the pool in [place].       Pain: 4/10     Objective  Treatment per log below. Gait analysis presents wi hincreased lateral shin whe toe off from gait.            Assessment  Responded well with improved tolerance for exercises. Unable to provoke symptoms with sural and sciatic nerve glides.    Goals (to be met in 12 visits)   1. Improve upon CHRYSTAL assessment > 11% from INE to DC.  2. Patient will be aware of postural limitations and be able to correct them independently.    3. Patient will have an increase in spinal mobility to >75% without symptom provocation in order to return to improved tolerance for sitting, sustained ambulation, and exercise.    4. Patient will have an increase in core strength to assist with returning to sustained posturing.   5. Patient will demonstrate an increase in MLT of the iliopsoas and hamstrings in order to return to more normalized ambulation and exercise.       Plan  Assess response and progress as tolerated.    Treatment Last 4 Visits       2/24/2025 2/28/2025 3/3/2025 3/5/2025   PT Treatment   Treatment Day 2 3 4 5   Therapeutic Exercise Elliptical L2 x 4 min   KTOS 30 sec x 3  Open book stretch 10 sec x 10  SL Clamshell red x 20  Kneeling Hip Hiking x 20  TRX Lat Stretch 10 reps   Cable Column Bilateral Extension 48# x 20  Cable Column Unilateral Rows 36# x20   Elliptical L2 x 4 min   KTOS 30  sec x 3  Open book stretch 10 sec x 10  SL Clamshell green x 20  Kneeling Hip Hiking x 20  TRX Lat Stretch 10 reps   Cable Column Bilateral Extension 48# x 20  Cable Column Unilateral Rows 36# x20  Prostretch 30 sec x 3   Elliptical L2 x 4 min   Sciatic nerve glide x 10  Open book stretch 10 sec x 10  Seated flexion stretch and bilateral SB x 10  TrA Tabletop Marches x 20; BKFO x 20  TRX Lat Stretch 10 reps   Closed chain hamstring stretch 10 sec x 10  Prostretch 30 sec x 3    Neuro Re-Ed  Rhythmic stabilization x 10 reps for TrA recruitment.       Manual Therapy Hip IR and FLXN Manipulation x 10 min Lumbar SB manipulation x 10 min Lumbar SB manipulation left side x 10    Therapeutic Exercise Min 25 25 25    Manual Therapy Min 15 15 15    Total of Timed Procedures 40 40 40    Total of Service Based 0 0 0    Total Treatment Time 40 40 40           2/24/2025 2/28/2025 3/3/2025 3/5/2025   Spine Treatment   Treatment Day 2 3 4 5   Therapeutic Exercise    Elliptical L2 x 4 min   Sciatic nerve glide x 10  SL Clamshells red x 20  Shuttle L5 bilateral press; Single leg L4 x 20  TRX Lat Stretch 10 reps   Closed chain hamstring stretch 10 sec x 10  Prostretch 30 sec x 3  Cable Column bilateral shoulder extension for TrA recruitment x 20 48#  Cable column unilateral rows 36# x 20  Ball Roll FB and right SB  Open Book Stretch 10 sec x 10   Therapeutic Exercise Minutes    40   Total Time Of Timed Procedures    40   Total Time Of Service-Based Procedures    0   Total Treatment Time    40          Charges  TherEx 3

## 2025-03-10 ENCOUNTER — OFFICE VISIT (OUTPATIENT)
Dept: PHYSICAL THERAPY | Age: 70
End: 2025-03-10
Attending: INTERNAL MEDICINE
Payer: MEDICARE

## 2025-03-10 PROCEDURE — 97140 MANUAL THERAPY 1/> REGIONS: CPT

## 2025-03-10 PROCEDURE — 97110 THERAPEUTIC EXERCISES: CPT

## 2025-03-31 ENCOUNTER — APPOINTMENT (OUTPATIENT)
Dept: PHYSICAL THERAPY | Age: 70
End: 2025-03-31
Attending: INTERNAL MEDICINE
Payer: MEDICARE

## 2025-04-07 ENCOUNTER — APPOINTMENT (OUTPATIENT)
Dept: PHYSICAL THERAPY | Age: 70
End: 2025-04-07
Attending: INTERNAL MEDICINE
Payer: MEDICARE

## 2025-04-09 ENCOUNTER — APPOINTMENT (OUTPATIENT)
Dept: PHYSICAL THERAPY | Age: 70
End: 2025-04-09
Attending: INTERNAL MEDICINE
Payer: MEDICARE

## 2025-05-23 ENCOUNTER — OFFICE VISIT (OUTPATIENT)
Dept: INTERNAL MEDICINE CLINIC | Facility: CLINIC | Age: 70
End: 2025-05-23
Payer: MEDICARE

## 2025-05-23 VITALS
OXYGEN SATURATION: 99 % | RESPIRATION RATE: 16 BRPM | HEIGHT: 63 IN | SYSTOLIC BLOOD PRESSURE: 146 MMHG | DIASTOLIC BLOOD PRESSURE: 72 MMHG | HEART RATE: 80 BPM | TEMPERATURE: 97 F | WEIGHT: 166.38 LBS | BODY MASS INDEX: 29.48 KG/M2

## 2025-05-23 DIAGNOSIS — E78.00 PURE HYPERCHOLESTEROLEMIA: ICD-10-CM

## 2025-05-23 DIAGNOSIS — N60.19 FIBROCYSTIC BREAST DISEASE (FCBD), UNSPECIFIED LATERALITY: ICD-10-CM

## 2025-05-23 DIAGNOSIS — Z80.3 FAMILY HISTORY OF BREAST CANCER IN SISTER: ICD-10-CM

## 2025-05-23 DIAGNOSIS — K21.9 GASTROESOPHAGEAL REFLUX DISEASE WITHOUT ESOPHAGITIS: ICD-10-CM

## 2025-05-23 DIAGNOSIS — I10 ESSENTIAL HYPERTENSION: ICD-10-CM

## 2025-05-23 DIAGNOSIS — Z85.828 HISTORY OF BASAL CELL CARCINOMA: ICD-10-CM

## 2025-05-23 DIAGNOSIS — G89.29 CHRONIC BILATERAL LOW BACK PAIN WITH LEFT-SIDED SCIATICA: ICD-10-CM

## 2025-05-23 DIAGNOSIS — M81.0 AGE-RELATED OSTEOPOROSIS WITHOUT CURRENT PATHOLOGICAL FRACTURE: ICD-10-CM

## 2025-05-23 DIAGNOSIS — Z00.00 WELLNESS EXAMINATION: Primary | ICD-10-CM

## 2025-05-23 DIAGNOSIS — Z86.0101 HISTORY OF ADENOMATOUS POLYP OF COLON: ICD-10-CM

## 2025-05-23 DIAGNOSIS — R53.83 FATIGUE, UNSPECIFIED TYPE: ICD-10-CM

## 2025-05-23 DIAGNOSIS — M54.42 CHRONIC BILATERAL LOW BACK PAIN WITH LEFT-SIDED SCIATICA: ICD-10-CM

## 2025-05-23 DIAGNOSIS — R73.01 ELEVATED FASTING GLUCOSE: ICD-10-CM

## 2025-05-23 DIAGNOSIS — B00.1 RECURRENT COLD SORES: ICD-10-CM

## 2025-05-23 RX ORDER — MAGNESIUM OXIDE/MAG AA CHELATE 300 MG
CAPSULE ORAL
COMMUNITY
Start: 2025-01-01

## 2025-05-23 RX ORDER — FOLIC ACID 1 MG/1
TABLET ORAL
COMMUNITY
Start: 2025-01-01

## 2025-05-23 RX ORDER — METOPROLOL TARTRATE 50 MG
50 TABLET ORAL 2 TIMES DAILY
Qty: 180 TABLET | Refills: 3 | Status: SHIPPED | OUTPATIENT
Start: 2025-05-23

## 2025-05-23 RX ORDER — ATORVASTATIN CALCIUM 80 MG/1
80 TABLET, FILM COATED ORAL NIGHTLY
Qty: 90 TABLET | Refills: 3 | Status: SHIPPED | OUTPATIENT
Start: 2025-05-23

## 2025-05-23 NOTE — PROGRESS NOTES
Subjective:   Nuvia Balbuena is a 70 year old female who presents for a MA AHA (Medicare Advantage Annual Health Assessment) and scheduled follow up of multiple significant but stable problems.               HTN - BP elevated on initial reading    HL - on statin     Mammo due in 10/2025  Colonoscopy due in 9/2026    Takes pepcid once in awhile for heartburn and symptoms otherwise controlled by diet    Osteoporosis - last DEXA a year ago and not interested in medication at this point.     History/Other:   Fall Risk Assessment:   She has been screened for Falls and is low risk.      Cognitive Assessment:   She had a completely normal cognitive assessment - see flowsheet entries       Functional Ability/Status:   Nuvia Balbuena has a completely normal functional assessment. See flowsheet for details.      Depression Screening (PHQ):  PHQ-2 SCORE: 0  , done 5/18/2025   Last [place] Suicide Screening on 5/23/2025 was No Risk.       Advanced Directives:   She does have a Living Will but we do NOT have it on file in Epic.    She does NOT have a Power of  for Health Care. [Do you have a healthcare power of ?: (Patient-Rptd) No]  Patient has Advance Care Planning documents but we do not have a copy in EMR. Discussed Advanced Care Planning with patient and instructed patient to get our office a copy to be scanned into EMR.      Problem List[1]  Allergies:  She is allergic to penicillins.    Current Medications:  Active Meds, Sig Only[2]    Medical History:  She  has a past medical history of Allergic rhinitis, Basal cell carcinoma (BCC) (3/31/2023), Esophageal reflux, Essential hypertension, Fibrocystic breast disease (11/06/2014), Heartburn (2015), Hemorrhoids (1990), High cholesterol, History of cardiac murmur (2019), Hyperlipidemia, Indigestion (2015), Pain in joints (2016), and Wears glasses (2000).  Surgical History:  She  has a past surgical history that includes Breast biopsy (08/2011); colonoscopy  (08/2015); and skin surgery (10/2022).   Family History:  Her family history includes Breast Cancer in her sister and sister; Heart Attack in her father; Hypertension in her brother and sister; Mental Disorder in her daughter; Stroke in her father.  Social History:  She  reports that she has never smoked. She has never used smokeless tobacco. She reports current alcohol use. She reports that she does not use drugs.    Tobacco:  She has never smoked tobacco.    CAGE Alcohol Screen:   CAGE screening score of 0 on 5/18/2025, showing low risk of alcohol abuse.      Patient Care Team:  Debbie Manley MD as PCP - General (Internal Medicine)  Adalberto Blanton PT as Physical Therapist (Physical Therapy)  Mynor Guerrero PT (Physical Therapy)  Mynor Guerrero PT as Physical Therapist  Spenser Chin PT as Physical Therapist    Review of Systems   Constitutional:  Negative for fever.   HENT:  Negative for congestion.    Eyes:  Negative for visual disturbance.   Respiratory:  Negative for shortness of breath.    Cardiovascular:  Negative for chest pain.   Gastrointestinal:  Negative for constipation.   Genitourinary:  Negative for dysuria.   Neurological: Negative.    Hematological: Negative.    Psychiatric/Behavioral: Negative.           Objective:   Physical Exam  Vitals reviewed.   Constitutional:       General: She is not in acute distress.     Appearance: She is well-developed.   HENT:      Head: Normocephalic and atraumatic.      Right Ear: Tympanic membrane normal.      Left Ear: Tympanic membrane normal.   Eyes:      Conjunctiva/sclera: Conjunctivae normal.   Cardiovascular:      Rate and Rhythm: Normal rate and regular rhythm.      Heart sounds: Normal heart sounds.   Pulmonary:      Effort: Pulmonary effort is normal.      Breath sounds: Normal breath sounds.   Abdominal:      Palpations: Abdomen is soft.      Tenderness: There is no abdominal tenderness.   Musculoskeletal:      Cervical back: Neck supple.   Skin:      General: Skin is warm and dry.   Neurological:      General: No focal deficit present.      Mental Status: She is alert.   Psychiatric:         Mood and Affect: Mood normal.           /72   Pulse 80   Temp 96.5 °F (35.8 °C) (Temporal)   Resp 16   Ht 5' 3\" (1.6 m)   Wt 166 lb 6.4 oz (75.5 kg)   SpO2 99%   BMI 29.48 kg/m²  Estimated body mass index is 29.48 kg/m² as calculated from the following:    Height as of this encounter: 5' 3\" (1.6 m).    Weight as of this encounter: 166 lb 6.4 oz (75.5 kg).    Medicare Hearing Assessment:   Hearing Screening    Screening Method: Finger Rub  Finger Rub Result: Pass               Assessment & Plan:   Nuvia Balbuena is a 70 year old female who presents for a Medicare Assessment.     1. Wellness examination (Primary)  -due for shingrix and prevnar 20   2. Essential hypertension  -     Metoprolol Tartrate; Take 1 tablet (50 mg total) by mouth 2 (two) times daily.  Dispense: 180 tablet; Refill: 3  3. Pure hypercholesterolemia - chronic, stable. Cont statin  -     Atorvastatin Calcium; Take 1 tablet (80 mg total) by mouth nightly.  Dispense: 90 tablet; Refill: 3  -     Lipid Panel; Future; Expected date: 05/23/2025  4. Elevated fasting glucose -chronic, stable. Cont low carb diet. A1c last month at 5.9   -     Hemoglobin A1C; Future; Expected date: 05/23/2025  5. Age-related osteoporosis without current pathological fracture - chronic, stable. Plan for repeating dexa next year. She was not interested in medication. Continue weight bearing exercise and plenty of Ca+ in the diet.   -     Vitamin D; Future; Expected date: 05/23/2025  6. Fatigue, unspecified type - check labs  -     CBC With Differential With Platelet; Future; Expected date: 05/23/2025  -     Comp Metabolic Panel (14); Future; Expected date: 05/23/2025  -     TSH W Reflex To Free T4; Future; Expected date: 05/23/2025  7. Chronic bilateral low back pain with left-sided sciatica -chronic, worsened. Check MRI  lumbar spine. Previously did physical therapy  -     MRI SPINE LUMBAR (CPT=72148); Future; Expected date: 05/23/2025  8. History of adenomatous polyp of colon -stable  9. Gastroesophageal reflux disease without esophagitis -chronic, stable  10. Fibrocystic breast disease (FCBD), unspecified laterality -stable  11. Recurrent cold sores -stable  12. History of basal cell carcinoma -stable  13. Family history of breast cancer in sister -stable            The patient indicates understanding of these issues and agrees to the plan.  Reinforced healthy diet, lifestyle, and exercise.      Return in about 1 year (around 5/23/2026).     Debbie Manley MD, 5/23/2025     Supplementary Documentation:   General Health:  In the past six months, have you lost more than 10 pounds without trying?: (Patient-Rptd) 2 - No  Has your appetite been poor?: (Patient-Rptd) No  Type of Diet: (Patient-Rptd) Balanced  How does the patient maintain a good energy level?: (Patient-Rptd) Appropriate Exercise, Stretching  How would you describe your daily physical activity?: (Patient-Rptd) Moderate  How would you describe your current health state?: (Patient-Rptd) Good  How do you maintain positive mental well-being?: (Patient-Rptd) Social Interaction, Puzzles, Games, Visiting Friends, Visiting Family  On a scale of 0 to 10, with 0 being no pain and 10 being severe pain, what is your pain level?: (Patient-Rptd) 4 - (Moderate)  In the past six months, have you experienced urine leakage?: (Patient-Rptd) 0-No  At any time do you feel concerned for the safety/well-being of yourself and/or your children, in your home or elsewhere?: (Patient-Rptd) No  Have you had any immunizations at another office such as Influenza, Hepatitis B, Tetanus, or Pneumococcal?: (Patient-Rptd) No    Health Maintenance   Topic Date Due    Pneumococcal Vaccine: 50+ Years (1 of 1 - PCV) Never done    Zoster Vaccines (1 of 2) Never done    COVID-19 Vaccine (7 - 2024-25 season)  09/01/2024    Annual Well Visit  01/01/2025    Influenza Vaccine (Season Ended) 10/01/2025    Mammogram  10/28/2025    Colorectal Cancer Screening  09/02/2026    DEXA Scan  Completed    Annual Depression Screening  Completed    Fall Risk Screening (Annual)  Completed    Meningococcal B Vaccine  Aged Out            [1]   Patient Active Problem List  Diagnosis    Fibrocystic breast disease    Hyperlipidemia    Essential hypertension    Family history of breast cancer in sister    History of adenomatous polyp of colon    Recurrent cold sores    Elevated fasting glucose    History of basal cell carcinoma    Gastroesophageal reflux disease without esophagitis   [2]   Outpatient Medications Marked as Taking for the 5/23/25 encounter (Office Visit) with Debbie Manley MD   Medication Sig    Cholecalciferol (VITAMIN D3) 1.25 MG (55542 UT) Oral Cap     Magnesium 300 MG Oral Cap     metoprolol tartrate 50 MG Oral Tab Take 1 tablet (50 mg total) by mouth 2 (two) times daily.    atorvastatin 80 MG Oral Tab Take 1 tablet (80 mg total) by mouth nightly.    Probiotic Product (PROBIOTIC DAILY OR) Take by mouth.    valACYclovir 1 G Oral Tab Take 2 tablets po every 12 hours x1 day. Start at the onset of symptoms.    famotidine 20 MG Oral Tab Take 1 tablet (20 mg total) by mouth nightly as needed.    Ascorbic Acid (VITAMIN C) 1000 MG Oral Tab Take 1 tablet (1,000 mg total) by mouth daily.    Multiple Vitamins-Minerals (MULTIVITAMIN OR) Take by mouth daily.

## 2025-05-23 NOTE — PATIENT INSTRUCTIONS
Prevnar 20 pneumonia     Shingrix shingles vaccine recommended      Blood work     If blood pressure remains elevated on re-check, please check at home and send me readings in 1-2 weeks.

## 2025-06-25 ENCOUNTER — HOSPITAL ENCOUNTER (OUTPATIENT)
Dept: MRI IMAGING | Age: 70
Discharge: HOME | End: 2025-06-25
Attending: INTERNAL MEDICINE
Payer: MEDICARE

## 2025-06-25 DIAGNOSIS — G89.29 CHRONIC BILATERAL LOW BACK PAIN WITH LEFT-SIDED SCIATICA: ICD-10-CM

## 2025-06-25 DIAGNOSIS — M54.42 CHRONIC BILATERAL LOW BACK PAIN WITH LEFT-SIDED SCIATICA: ICD-10-CM

## 2025-06-25 PROCEDURE — 72148 MRI LUMBAR SPINE W/O DYE: CPT | Performed by: INTERNAL MEDICINE

## 2025-06-27 DIAGNOSIS — G89.29 CHRONIC BILATERAL LOW BACK PAIN WITH LEFT-SIDED SCIATICA: Primary | ICD-10-CM

## 2025-06-27 DIAGNOSIS — M54.42 CHRONIC BILATERAL LOW BACK PAIN WITH LEFT-SIDED SCIATICA: Primary | ICD-10-CM

## 2025-07-22 ENCOUNTER — OFFICE VISIT (OUTPATIENT)
Facility: CLINIC | Age: 70
End: 2025-07-22
Payer: MEDICARE

## 2025-07-22 VITALS — WEIGHT: 166 LBS | BODY MASS INDEX: 29.41 KG/M2 | HEIGHT: 63 IN

## 2025-07-22 DIAGNOSIS — G89.29 CHRONIC LEFT-SIDED LOW BACK PAIN WITH LEFT-SIDED SCIATICA: Primary | ICD-10-CM

## 2025-07-22 DIAGNOSIS — M47.816 LUMBAR SPONDYLOSIS: ICD-10-CM

## 2025-07-22 DIAGNOSIS — M54.42 CHRONIC LEFT-SIDED LOW BACK PAIN WITH LEFT-SIDED SCIATICA: Primary | ICD-10-CM

## 2025-07-22 DIAGNOSIS — M43.16 SPONDYLOLISTHESIS OF LUMBAR REGION: ICD-10-CM

## 2025-07-22 PROCEDURE — 99204 OFFICE O/P NEW MOD 45 MIN: CPT | Performed by: FAMILY MEDICINE

## 2025-07-22 PROCEDURE — 1159F MED LIST DOCD IN RCRD: CPT | Performed by: FAMILY MEDICINE

## 2025-07-22 PROCEDURE — 1125F AMNT PAIN NOTED PAIN PRSNT: CPT | Performed by: FAMILY MEDICINE

## 2025-07-22 PROCEDURE — 1160F RVW MEDS BY RX/DR IN RCRD: CPT | Performed by: FAMILY MEDICINE

## 2025-07-22 NOTE — H&P
Sports Medicine Clinic Note    Subjective:    Chief Complaint: Chronic low back pain with sciatica    History: 70-year-old female with a longstanding history of low back pain and bilateral sciatica, gradually improving over time. She reports intermittent numbness and tingling, primarily affecting her foot when standing. There was a severe flare-up in January that was temporarily debilitating. She has not received steroid treatment or other medications recently. Previous physical therapy yielded mixed results, and she is currently self-managing with exercises via the [employer] nabor. No history of spine surgery.    Objective:    Lumbar Spine Examination:    Inspection: Normal spinal alignment with no visible deformities or swelling.  Palpation: Mild tenderness to palpation over the lower lumbar spine and left paraspinal muscles.  Range of Motion: Mildly limited lumbar flexion and extension with discomfort reported at end ranges.  Neurovascular: Intact motor strength in bilateral lower extremities. Sensation is diminished along the left lateral calf and dorsum of the foot. Reflexes symmetric and within normal limits.  Special Tests: Straight leg raise elicits mild discomfort in the left leg at 60 degrees. Negative crossed straight leg raise. No clonus or Babinski sign noted.    Diagnostic Tests:    MRI lumbar spine reviewed. Demonstrates mild degenerative disc disease and facet arthropathy. Grade 1 anterior listhesis of L5 over L6 with associated mild central canal stenosis at L5-L6. No significant foraminal stenosis or nerve root compression observed. No acute osseous abnormalities or paraspinal mass identified.    Radiographs of the lumbar spine personally reviewed. No acute fractures identified. Moderate to severe osteoarthritic changes at L4-5 and L5-S1. Thinning of the pars interarticularis bilaterally at L5 without a true pars defect. Mild grade 1 anterior subluxation of L4 on L5, degenerative in nature. Minimal  right-convex lumbar scoliosis noted.    Assessment:    Chronic low back pain with left-sided sciatica  Lumbar spondylosis with mild central canal stenosis  Grade 1 L5-L6 anterolisthesis    Plan:    Additional Workup: None indicated at this time due to symptom improvement.  Therapy: Continue home-based exercise program targeting lumbar spine and gluteal strength. Encourage consistency with [employer] routines.  Medications: Consider muscle relaxant and steroids as needed for acute flare-ups.  Bracing/Casting: None indicated.  Procedures: Consider epidural steroid injection if symptoms recur or worsen significantly.  Activity Recommendations: Maintain physical activity within tolerance. Avoid prolonged sitting or standing. Emphasize weight management strategies to reduce axial load on the lumbar spine.    Follow-Up: Tentatively scheduled as needed depending on symptom progression or recurrence.      Lionel Amaya DO, CAQSM   Primary Care Sports Medicine

## 2025-08-26 ENCOUNTER — LAB ENCOUNTER (OUTPATIENT)
Dept: LAB | Age: 70
End: 2025-08-26
Attending: INTERNAL MEDICINE

## 2025-08-26 DIAGNOSIS — R53.83 FATIGUE, UNSPECIFIED TYPE: ICD-10-CM

## 2025-08-26 DIAGNOSIS — R73.01 ELEVATED FASTING GLUCOSE: ICD-10-CM

## 2025-08-26 DIAGNOSIS — M81.0 AGE-RELATED OSTEOPOROSIS WITHOUT CURRENT PATHOLOGICAL FRACTURE: ICD-10-CM

## 2025-08-26 DIAGNOSIS — E78.00 PURE HYPERCHOLESTEROLEMIA: ICD-10-CM

## 2025-08-26 LAB
ALBUMIN SERPL-MCNC: 4.5 G/DL (ref 3.2–4.8)
ALBUMIN/GLOB SERPL: 1.5 (ref 1–2)
ALP LIVER SERPL-CCNC: 107 U/L (ref 55–142)
ALT SERPL-CCNC: 32 U/L (ref 10–49)
ANION GAP SERPL CALC-SCNC: 13 MMOL/L (ref 0–18)
AST SERPL-CCNC: 32 U/L (ref ?–34)
BASOPHILS # BLD AUTO: 0.04 X10(3) UL (ref 0–0.2)
BASOPHILS NFR BLD AUTO: 0.6 %
BILIRUB SERPL-MCNC: 1 MG/DL (ref 0.2–1.1)
BUN BLD-MCNC: 13 MG/DL (ref 9–23)
CALCIUM BLD-MCNC: 10.2 MG/DL (ref 8.7–10.6)
CHLORIDE SERPL-SCNC: 105 MMOL/L (ref 98–112)
CHOLEST SERPL-MCNC: 178 MG/DL (ref ?–200)
CO2 SERPL-SCNC: 25 MMOL/L (ref 21–32)
CREAT BLD-MCNC: 0.84 MG/DL (ref 0.55–1.02)
EGFRCR SERPLBLD CKD-EPI 2021: 76 ML/MIN/1.73M2 (ref 60–?)
EOSINOPHIL # BLD AUTO: 0.24 X10(3) UL (ref 0–0.7)
EOSINOPHIL NFR BLD AUTO: 3.3 %
ERYTHROCYTE [DISTWIDTH] IN BLOOD BY AUTOMATED COUNT: 12.4 %
EST. AVERAGE GLUCOSE BLD GHB EST-MCNC: 128 MG/DL (ref 68–126)
FASTING PATIENT LIPID ANSWER: YES
FASTING STATUS PATIENT QL REPORTED: YES
GLOBULIN PLAS-MCNC: 3 G/DL (ref 2–3.5)
GLUCOSE BLD-MCNC: 121 MG/DL (ref 70–99)
HBA1C MFR BLD: 6.1 % (ref ?–5.7)
HCT VFR BLD AUTO: 47.4 % (ref 35–48)
HDLC SERPL-MCNC: 67 MG/DL (ref 40–59)
HGB BLD-MCNC: 15.5 G/DL (ref 12–16)
IMM GRANULOCYTES # BLD AUTO: 0.03 X10(3) UL (ref 0–1)
IMM GRANULOCYTES NFR BLD: 0.4 %
LDLC SERPL CALC-MCNC: 93 MG/DL (ref ?–100)
LYMPHOCYTES # BLD AUTO: 2.8 X10(3) UL (ref 1–4)
LYMPHOCYTES NFR BLD AUTO: 38.9 %
MCH RBC QN AUTO: 29.5 PG (ref 26–34)
MCHC RBC AUTO-ENTMCNC: 32.7 G/DL (ref 31–37)
MCV RBC AUTO: 90.1 FL (ref 80–100)
MONOCYTES # BLD AUTO: 0.54 X10(3) UL (ref 0.1–1)
MONOCYTES NFR BLD AUTO: 7.5 %
NEUTROPHILS # BLD AUTO: 3.54 X10 (3) UL (ref 1.5–7.7)
NEUTROPHILS # BLD AUTO: 3.54 X10(3) UL (ref 1.5–7.7)
NEUTROPHILS NFR BLD AUTO: 49.3 %
NONHDLC SERPL-MCNC: 111 MG/DL (ref ?–130)
OSMOLALITY SERPL CALC.SUM OF ELEC: 297 MOSM/KG (ref 275–295)
PLATELET # BLD AUTO: 232 10(3)UL (ref 150–450)
POTASSIUM SERPL-SCNC: 4.4 MMOL/L (ref 3.5–5.1)
PROT SERPL-MCNC: 7.5 G/DL (ref 5.7–8.2)
RBC # BLD AUTO: 5.26 X10(6)UL (ref 3.8–5.3)
SODIUM SERPL-SCNC: 143 MMOL/L (ref 136–145)
TRIGL SERPL-MCNC: 98 MG/DL (ref 30–149)
TSI SER-ACNC: 1.55 UIU/ML (ref 0.55–4.78)
VIT D+METAB SERPL-MCNC: 56.7 NG/ML (ref 30–100)
VLDLC SERPL CALC-MCNC: 16 MG/DL (ref 0–30)
WBC # BLD AUTO: 7.2 X10(3) UL (ref 4–11)

## 2025-08-26 PROCEDURE — 80061 LIPID PANEL: CPT

## 2025-08-26 PROCEDURE — 80053 COMPREHEN METABOLIC PANEL: CPT

## 2025-08-26 PROCEDURE — 36415 COLL VENOUS BLD VENIPUNCTURE: CPT

## 2025-08-26 PROCEDURE — 84443 ASSAY THYROID STIM HORMONE: CPT

## 2025-08-26 PROCEDURE — 85025 COMPLETE CBC W/AUTO DIFF WBC: CPT

## 2025-08-26 PROCEDURE — 83036 HEMOGLOBIN GLYCOSYLATED A1C: CPT

## 2025-08-26 PROCEDURE — 82306 VITAMIN D 25 HYDROXY: CPT

## (undated) NOTE — Clinical Note
I had the pleasure of seeing Scarlet Cardozo on 10/27/2021. Please see my attached note.     Pankaj Garcia MD FACS  [facility]

## (undated) NOTE — Clinical Note
I had the pleasure of seeing Milly Song on 10/10/2018. Please see my attached note. Shazia Kennedy MD FAC[facility]--Surgery

## (undated) NOTE — LETTER
04/12/18        [place] [id number]      Dear Briseyda Schaeffer,    [address] [facility] records indicate that you have outstanding lab work and or testing that was ordered for you and has not yet been completed:          Comp Metabolic Panel

## (undated) NOTE — Clinical Note
I had the pleasure of seeing Jillene Bosworth on 10/26/2020. Please see my attached note.     Ye Gaston MD FACS  [facility]

## (undated) NOTE — Clinical Note
I had the pleasure of seeing Danielle Wesly on 10/18/2017. Please see my attached note.   Martha Allan MD FACS [facility]

## (undated) NOTE — Clinical Note
I had the pleasure of seeing Darling Hodge on 11/20/2019. Please see my attached note. Marcial Stanley MD FAC[facility]